# Patient Record
Sex: MALE | Race: WHITE | URBAN - METROPOLITAN AREA
[De-identification: names, ages, dates, MRNs, and addresses within clinical notes are randomized per-mention and may not be internally consistent; named-entity substitution may affect disease eponyms.]

---

## 2024-07-03 ENCOUNTER — HOSPITAL ENCOUNTER (INPATIENT)
Facility: HOSPITAL | Age: 19
LOS: 1 days | Discharge: LEFT BEFORE DISCHARGE | DRG: 092 | End: 2024-07-07
Attending: STUDENT IN AN ORGANIZED HEALTH CARE EDUCATION/TRAINING PROGRAM | Admitting: STUDENT IN AN ORGANIZED HEALTH CARE EDUCATION/TRAINING PROGRAM

## 2024-07-03 DIAGNOSIS — R65.10 SIRS (SYSTEMIC INFLAMMATORY RESPONSE SYNDROME): ICD-10-CM

## 2024-07-03 DIAGNOSIS — E16.2 HYPOGLYCEMIA: ICD-10-CM

## 2024-07-03 DIAGNOSIS — R07.9 CHEST PAIN: ICD-10-CM

## 2024-07-03 DIAGNOSIS — Z91.89 AT RISK FOR PROLONGED QT INTERVAL SYNDROME: ICD-10-CM

## 2024-07-03 DIAGNOSIS — F19.920 DRUG INTOXICATION WITHOUT COMPLICATION: ICD-10-CM

## 2024-07-03 DIAGNOSIS — R41.82 ALTERED MENTAL STATUS: Primary | ICD-10-CM

## 2024-07-03 LAB
ALBUMIN SERPL BCP-MCNC: 3.8 G/DL (ref 3.5–5.2)
ALLENS TEST: ABNORMAL
ALLENS TEST: NORMAL
ALP SERPL-CCNC: 65 U/L (ref 55–135)
ALT SERPL W/O P-5'-P-CCNC: 14 U/L (ref 10–44)
ANION GAP SERPL CALC-SCNC: 12 MMOL/L (ref 8–16)
APAP SERPL-MCNC: <3 UG/ML (ref 10–20)
APTT PPP: 22.3 SEC (ref 21–32)
AST SERPL-CCNC: 30 U/L (ref 10–40)
BASOPHILS # BLD AUTO: 0.03 K/UL (ref 0–0.2)
BASOPHILS NFR BLD: 0.3 % (ref 0–1.9)
BILIRUB SERPL-MCNC: 0.4 MG/DL (ref 0.1–1)
BUN SERPL-MCNC: 3 MG/DL (ref 6–20)
CALCIUM SERPL-MCNC: 8.8 MG/DL (ref 8.7–10.5)
CHLORIDE SERPL-SCNC: 110 MMOL/L (ref 95–110)
CK SERPL-CCNC: 1717 U/L (ref 20–200)
CO2 SERPL-SCNC: 24 MMOL/L (ref 23–29)
CREAT SERPL-MCNC: 0.9 MG/DL (ref 0.5–1.4)
DELSYS: ABNORMAL
DELSYS: NORMAL
DIFFERENTIAL METHOD BLD: ABNORMAL
EOSINOPHIL # BLD AUTO: 0 K/UL (ref 0–0.5)
EOSINOPHIL NFR BLD: 0 % (ref 0–8)
ERYTHROCYTE [DISTWIDTH] IN BLOOD BY AUTOMATED COUNT: 14 % (ref 11.5–14.5)
EST. GFR  (NO RACE VARIABLE): >60 ML/MIN/1.73 M^2
ETHANOL SERPL-MCNC: <10 MG/DL
GLUCOSE SERPL-MCNC: 69 MG/DL (ref 70–110)
HCO3 UR-SCNC: 26.4 MMOL/L (ref 24–28)
HCT VFR BLD AUTO: 45.3 % (ref 40–54)
HGB BLD-MCNC: 15.2 G/DL (ref 14–18)
IMM GRANULOCYTES # BLD AUTO: 0.01 K/UL (ref 0–0.04)
IMM GRANULOCYTES NFR BLD AUTO: 0.1 % (ref 0–0.5)
INR PPP: 1.2 (ref 0.8–1.2)
LDH SERPL L TO P-CCNC: 1.98 MMOL/L (ref 0.5–2.2)
LYMPHOCYTES # BLD AUTO: 1.9 K/UL (ref 1–4.8)
LYMPHOCYTES NFR BLD: 19.7 % (ref 18–48)
MAGNESIUM SERPL-MCNC: 1.9 MG/DL (ref 1.6–2.6)
MCH RBC QN AUTO: 31.7 PG (ref 27–31)
MCHC RBC AUTO-ENTMCNC: 33.6 G/DL (ref 32–36)
MCV RBC AUTO: 95 FL (ref 82–98)
MONOCYTES # BLD AUTO: 0.9 K/UL (ref 0.3–1)
MONOCYTES NFR BLD: 9.4 % (ref 4–15)
NEUTROPHILS # BLD AUTO: 6.7 K/UL (ref 1.8–7.7)
NEUTROPHILS NFR BLD: 70.5 % (ref 38–73)
NRBC BLD-RTO: 0 /100 WBC
PCO2 BLDA: 32.1 MMHG (ref 35–45)
PH SMN: 7.52 [PH] (ref 7.35–7.45)
PLATELET # BLD AUTO: 253 K/UL (ref 150–450)
PMV BLD AUTO: 10.3 FL (ref 9.2–12.9)
PO2 BLDA: 77 MMHG (ref 40–60)
POC BE: 4 MMOL/L
POC SATURATED O2: 97 % (ref 95–100)
POC TCO2: 27 MMOL/L (ref 24–29)
POTASSIUM SERPL-SCNC: 3.7 MMOL/L (ref 3.5–5.1)
PROCALCITONIN SERPL IA-MCNC: <0.02 NG/ML
PROT SERPL-MCNC: 6.8 G/DL (ref 6–8.4)
PROTHROMBIN TIME: 12.7 SEC (ref 9–12.5)
RBC # BLD AUTO: 4.79 M/UL (ref 4.6–6.2)
SAMPLE: ABNORMAL
SAMPLE: NORMAL
SITE: ABNORMAL
SITE: NORMAL
SODIUM SERPL-SCNC: 146 MMOL/L (ref 136–145)
TSH SERPL DL<=0.005 MIU/L-ACNC: 1.41 UIU/ML (ref 0.4–4)
WBC # BLD AUTO: 9.55 K/UL (ref 3.9–12.7)

## 2024-07-03 PROCEDURE — 85610 PROTHROMBIN TIME: CPT | Performed by: STUDENT IN AN ORGANIZED HEALTH CARE EDUCATION/TRAINING PROGRAM

## 2024-07-03 PROCEDURE — 96375 TX/PRO/DX INJ NEW DRUG ADDON: CPT

## 2024-07-03 PROCEDURE — 63600175 PHARM REV CODE 636 W HCPCS

## 2024-07-03 PROCEDURE — 80143 DRUG ASSAY ACETAMINOPHEN: CPT | Performed by: STUDENT IN AN ORGANIZED HEALTH CARE EDUCATION/TRAINING PROGRAM

## 2024-07-03 PROCEDURE — 82550 ASSAY OF CK (CPK): CPT | Performed by: STUDENT IN AN ORGANIZED HEALTH CARE EDUCATION/TRAINING PROGRAM

## 2024-07-03 PROCEDURE — 96361 HYDRATE IV INFUSION ADD-ON: CPT

## 2024-07-03 PROCEDURE — 85730 THROMBOPLASTIN TIME PARTIAL: CPT | Performed by: STUDENT IN AN ORGANIZED HEALTH CARE EDUCATION/TRAINING PROGRAM

## 2024-07-03 PROCEDURE — 84443 ASSAY THYROID STIM HORMONE: CPT | Performed by: STUDENT IN AN ORGANIZED HEALTH CARE EDUCATION/TRAINING PROGRAM

## 2024-07-03 PROCEDURE — 84145 PROCALCITONIN (PCT): CPT | Performed by: STUDENT IN AN ORGANIZED HEALTH CARE EDUCATION/TRAINING PROGRAM

## 2024-07-03 PROCEDURE — 87040 BLOOD CULTURE FOR BACTERIA: CPT | Performed by: STUDENT IN AN ORGANIZED HEALTH CARE EDUCATION/TRAINING PROGRAM

## 2024-07-03 PROCEDURE — 63600175 PHARM REV CODE 636 W HCPCS: Performed by: STUDENT IN AN ORGANIZED HEALTH CARE EDUCATION/TRAINING PROGRAM

## 2024-07-03 PROCEDURE — 80047 BASIC METABLC PNL IONIZED CA: CPT

## 2024-07-03 PROCEDURE — 82803 BLOOD GASES ANY COMBINATION: CPT

## 2024-07-03 PROCEDURE — 93005 ELECTROCARDIOGRAM TRACING: CPT

## 2024-07-03 PROCEDURE — 83605 ASSAY OF LACTIC ACID: CPT

## 2024-07-03 PROCEDURE — 85025 COMPLETE CBC W/AUTO DIFF WBC: CPT | Performed by: STUDENT IN AN ORGANIZED HEALTH CARE EDUCATION/TRAINING PROGRAM

## 2024-07-03 PROCEDURE — 80053 COMPREHEN METABOLIC PANEL: CPT | Performed by: STUDENT IN AN ORGANIZED HEALTH CARE EDUCATION/TRAINING PROGRAM

## 2024-07-03 PROCEDURE — 99900035 HC TECH TIME PER 15 MIN (STAT)

## 2024-07-03 PROCEDURE — 83735 ASSAY OF MAGNESIUM: CPT | Performed by: STUDENT IN AN ORGANIZED HEALTH CARE EDUCATION/TRAINING PROGRAM

## 2024-07-03 PROCEDURE — 93010 ELECTROCARDIOGRAM REPORT: CPT | Mod: ,,, | Performed by: INTERNAL MEDICINE

## 2024-07-03 PROCEDURE — 96376 TX/PRO/DX INJ SAME DRUG ADON: CPT

## 2024-07-03 PROCEDURE — 99285 EMERGENCY DEPT VISIT HI MDM: CPT | Mod: 25

## 2024-07-03 PROCEDURE — 82077 ASSAY SPEC XCP UR&BREATH IA: CPT | Performed by: STUDENT IN AN ORGANIZED HEALTH CARE EDUCATION/TRAINING PROGRAM

## 2024-07-03 PROCEDURE — 96374 THER/PROPH/DIAG INJ IV PUSH: CPT | Mod: 59

## 2024-07-03 RX ORDER — LORAZEPAM 2 MG/ML
2 INJECTION INTRAMUSCULAR
Status: COMPLETED | OUTPATIENT
Start: 2024-07-03 | End: 2024-07-03

## 2024-07-03 RX ORDER — LORAZEPAM 2 MG/ML
1 INJECTION INTRAMUSCULAR
Status: COMPLETED | OUTPATIENT
Start: 2024-07-03 | End: 2024-07-03

## 2024-07-03 RX ORDER — ACETAMINOPHEN 500 MG
1000 TABLET ORAL
Status: DISPENSED | OUTPATIENT
Start: 2024-07-03 | End: 2024-07-04

## 2024-07-03 RX ORDER — HALOPERIDOL 5 MG/ML
5 INJECTION INTRAMUSCULAR
Status: COMPLETED | OUTPATIENT
Start: 2024-07-03 | End: 2024-07-03

## 2024-07-03 RX ADMIN — LORAZEPAM 1 MG: 2 INJECTION INTRAMUSCULAR; INTRAVENOUS at 10:07

## 2024-07-03 RX ADMIN — LORAZEPAM 2 MG: 2 INJECTION INTRAMUSCULAR; INTRAVENOUS at 08:07

## 2024-07-03 RX ADMIN — SODIUM CHLORIDE, POTASSIUM CHLORIDE, SODIUM LACTATE AND CALCIUM CHLORIDE 1914 ML: 600; 310; 30; 20 INJECTION, SOLUTION INTRAVENOUS at 08:07

## 2024-07-03 RX ADMIN — HALOPERIDOL LACTATE 5 MG: 5 INJECTION, SOLUTION INTRAMUSCULAR at 07:07

## 2024-07-03 RX ADMIN — IOHEXOL 75 ML: 350 INJECTION, SOLUTION INTRAVENOUS at 11:07

## 2024-07-03 NOTE — Clinical Note
Diagnosis: Altered mental status [780.97.ICD-9-CM]   Future Attending Provider: GARCÍA TINAJERO [[id number]]   Is the patient being sent to ED Observation?: No

## 2024-07-04 PROBLEM — E04.1 THYROID NODULE: Status: ACTIVE | Noted: 2024-07-04

## 2024-07-04 PROBLEM — Z59.00 HOMELESSNESS: Chronic | Status: ACTIVE | Noted: 2024-07-04

## 2024-07-04 PROBLEM — Z91.89 AT RISK FOR PROLONGED QT INTERVAL SYNDROME: Status: ACTIVE | Noted: 2024-07-04

## 2024-07-04 PROBLEM — R50.9 FEVER: Status: ACTIVE | Noted: 2024-07-04

## 2024-07-04 PROBLEM — G92.8 TOXIC METABOLIC ENCEPHALOPATHY: Status: ACTIVE | Noted: 2024-07-04

## 2024-07-04 PROBLEM — R74.8 ELEVATED CPK: Status: ACTIVE | Noted: 2024-07-04

## 2024-07-04 PROBLEM — R65.10 SIRS (SYSTEMIC INFLAMMATORY RESPONSE SYNDROME): Status: ACTIVE | Noted: 2024-07-04

## 2024-07-04 PROBLEM — E87.3 RESPIRATORY ALKALOSIS: Status: ACTIVE | Noted: 2024-07-04

## 2024-07-04 PROBLEM — R41.82 ALTERED MENTAL STATUS: Status: ACTIVE | Noted: 2024-07-04

## 2024-07-04 LAB
AMPHET+METHAMPHET UR QL: NEGATIVE
BARBITURATES UR QL SCN>200 NG/ML: NEGATIVE
BENZODIAZ UR QL SCN>200 NG/ML: NEGATIVE
BILIRUB UR QL STRIP: NEGATIVE
BZE UR QL SCN: NEGATIVE
CANNABINOIDS UR QL SCN: ABNORMAL
CLARITY UR REFRACT.AUTO: CLEAR
COLOR UR AUTO: COLORLESS
CREAT UR-MCNC: 31 MG/DL (ref 23–375)
GLUCOSE UR QL STRIP: NEGATIVE
HGB UR QL STRIP: NEGATIVE
INFLUENZA A, MOLECULAR: NOT DETECTED
INFLUENZA B, MOLECULAR: NOT DETECTED
KETONES UR QL STRIP: NEGATIVE
LEUKOCYTE ESTERASE UR QL STRIP: NEGATIVE
METHADONE UR QL SCN>300 NG/ML: NEGATIVE
NITRITE UR QL STRIP: NEGATIVE
OHS QRS DURATION: 102 MS
OHS QRS DURATION: 102 MS
OHS QTC CALCULATION: 463 MS
OHS QTC CALCULATION: 463 MS
OPIATES UR QL SCN: NEGATIVE
PCP UR QL SCN>25 NG/ML: NEGATIVE
PH UR STRIP: 7 [PH] (ref 5–8)
PROT UR QL STRIP: NEGATIVE
RSV AG BY MOLECULAR METHOD: NOT DETECTED
SARS-COV-2 RNA RESP QL NAA+PROBE: NOT DETECTED
SP GR UR STRIP: 1.02 (ref 1–1.03)
TOXICOLOGY INFORMATION: ABNORMAL
URN SPEC COLLECT METH UR: ABNORMAL

## 2024-07-04 PROCEDURE — 25500020 PHARM REV CODE 255: Performed by: STUDENT IN AN ORGANIZED HEALTH CARE EDUCATION/TRAINING PROGRAM

## 2024-07-04 PROCEDURE — 96365 THER/PROPH/DIAG IV INF INIT: CPT

## 2024-07-04 PROCEDURE — 80307 DRUG TEST PRSMV CHEM ANLYZR: CPT | Performed by: STUDENT IN AN ORGANIZED HEALTH CARE EDUCATION/TRAINING PROGRAM

## 2024-07-04 PROCEDURE — G0378 HOSPITAL OBSERVATION PER HR: HCPCS

## 2024-07-04 PROCEDURE — 63600175 PHARM REV CODE 636 W HCPCS

## 2024-07-04 PROCEDURE — 96361 HYDRATE IV INFUSION ADD-ON: CPT

## 2024-07-04 PROCEDURE — 25000003 PHARM REV CODE 250

## 2024-07-04 PROCEDURE — 0241U SARS-COV2 (COVID) WITH FLU/RSV BY PCR: CPT | Performed by: STUDENT IN AN ORGANIZED HEALTH CARE EDUCATION/TRAINING PROGRAM

## 2024-07-04 PROCEDURE — 81003 URINALYSIS AUTO W/O SCOPE: CPT | Mod: 59 | Performed by: STUDENT IN AN ORGANIZED HEALTH CARE EDUCATION/TRAINING PROGRAM

## 2024-07-04 RX ORDER — SODIUM CHLORIDE 0.9 % (FLUSH) 0.9 %
1-10 SYRINGE (ML) INJECTION EVERY 12 HOURS PRN
Status: DISCONTINUED | OUTPATIENT
Start: 2024-07-04 | End: 2024-07-07 | Stop reason: HOSPADM

## 2024-07-04 RX ORDER — IBUPROFEN 200 MG
16 TABLET ORAL
Status: DISCONTINUED | OUTPATIENT
Start: 2024-07-04 | End: 2024-07-07 | Stop reason: HOSPADM

## 2024-07-04 RX ORDER — SIMETHICONE 80 MG
1 TABLET,CHEWABLE ORAL 4 TIMES DAILY PRN
Status: DISCONTINUED | OUTPATIENT
Start: 2024-07-04 | End: 2024-07-07 | Stop reason: HOSPADM

## 2024-07-04 RX ORDER — ONDANSETRON 8 MG/1
8 TABLET, ORALLY DISINTEGRATING ORAL EVERY 8 HOURS PRN
Status: DISCONTINUED | OUTPATIENT
Start: 2024-07-04 | End: 2024-07-05

## 2024-07-04 RX ORDER — ALUMINUM HYDROXIDE, MAGNESIUM HYDROXIDE, AND SIMETHICONE 1200; 120; 1200 MG/30ML; MG/30ML; MG/30ML
30 SUSPENSION ORAL 4 TIMES DAILY PRN
Status: DISCONTINUED | OUTPATIENT
Start: 2024-07-04 | End: 2024-07-07 | Stop reason: HOSPADM

## 2024-07-04 RX ORDER — TALC
6 POWDER (GRAM) TOPICAL NIGHTLY PRN
Status: DISCONTINUED | OUTPATIENT
Start: 2024-07-04 | End: 2024-07-07 | Stop reason: HOSPADM

## 2024-07-04 RX ORDER — ACETAMINOPHEN 325 MG/1
650 TABLET ORAL EVERY 4 HOURS PRN
Status: DISCONTINUED | OUTPATIENT
Start: 2024-07-04 | End: 2024-07-07 | Stop reason: HOSPADM

## 2024-07-04 RX ORDER — IBUPROFEN 200 MG
24 TABLET ORAL
Status: DISCONTINUED | OUTPATIENT
Start: 2024-07-04 | End: 2024-07-07 | Stop reason: HOSPADM

## 2024-07-04 RX ORDER — POLYETHYLENE GLYCOL 3350 17 G/17G
17 POWDER, FOR SOLUTION ORAL DAILY PRN
Status: DISCONTINUED | OUTPATIENT
Start: 2024-07-04 | End: 2024-07-07 | Stop reason: HOSPADM

## 2024-07-04 RX ORDER — ACETAMINOPHEN 325 MG/1
650 TABLET ORAL EVERY 8 HOURS PRN
Status: DISCONTINUED | OUTPATIENT
Start: 2024-07-04 | End: 2024-07-07 | Stop reason: HOSPADM

## 2024-07-04 RX ORDER — NALOXONE HCL 0.4 MG/ML
0.02 VIAL (ML) INJECTION
Status: DISCONTINUED | OUTPATIENT
Start: 2024-07-04 | End: 2024-07-07 | Stop reason: HOSPADM

## 2024-07-04 RX ORDER — GLUCAGON 1 MG
1 KIT INJECTION
Status: DISCONTINUED | OUTPATIENT
Start: 2024-07-04 | End: 2024-07-07 | Stop reason: HOSPADM

## 2024-07-04 RX ADMIN — SODIUM CHLORIDE, POTASSIUM CHLORIDE, SODIUM LACTATE AND CALCIUM CHLORIDE 1000 ML: 600; 310; 30; 20 INJECTION, SOLUTION INTRAVENOUS at 12:07

## 2024-07-04 RX ADMIN — DEXTROSE MONOHYDRATE 250 ML: 100 INJECTION, SOLUTION INTRAVENOUS at 01:07

## 2024-07-04 NOTE — ED NOTES
I-STAT Chem-8+ Results:   Value Reference Range   Sodium 144 136-145 mmol/L   Potassium  4.0 3.5-5.1 mmol/L   Chloride 109  mmol/L   Ionized Calcium 0.98 1.06-1.42 mmol/L   CO2 (measured) 26 23-29 mmol/L   Glucose 71  mg/dL   BUN <3 6-30 mg/dL   Creatinine 0.9 0.5-1.4 mg/dL   Hematocrit 40 36-54%

## 2024-07-04 NOTE — HPI
Beka Davis is a 18 y.o. male with unknown medical history who presents after being found under a truck.    Due to receiving sedation in the ED for agitation, he was unable to provide a history.    Per the ED, he was brought in by EMS after someone found him under a semi truck that he had been staying under.  He was reportedly oriented to person on arrival here, however agitated.  Received Ativan and Haldol, and now we will not participate in questioning or exam.  He was initially febrile and tachycardic.  Workup overall unremarkable for obvious etiology, therefore Hospital Medicine was consulted for admission.  He was PEC'd by the ED.

## 2024-07-04 NOTE — ED NOTES
Pt remains in paper scrubs, resting in stretcher comfortably - with side rails up, locked, and in lowest position. Chest rise and fall noted; breathing equal, even, and unlabored. Sitter remains at bedside in direct visual contact, charting per protocol every 15 minutes. No equipment or belongings are in the patients room to prevent self harm or injury. No acute distress noted and no needs expressed at this time. Will continue to assess periodically.

## 2024-07-04 NOTE — ED PROVIDER NOTES
Encounter Date: 7/3/2024       History     Chief Complaint   Patient presents with    Drug Overdose     Pt was found under semi truck by . Witnessed that pt under truck x 2 days. Pt is AAO x 2 and febrile upon arrival. Admittedly takes DMX.      Mr. Goodwin is a 18 y.o. homeless male who presents to the ED with AMS and agitation for an unknown timeline. Patient [place] bridge by EMS. Patient is AO x1 with orientation to self. Patient states that he is hungry. Patient denies any knowledge of taking any substances and doesn't remember anything. Patient is agitated and in 4 point restraints.     The history is provided by the patient. The history is limited by the condition of the patient.     Review of patient's allergies indicates:  No Known Allergies  History reviewed. No pertinent past medical history.  History reviewed. No pertinent surgical history.  No family history on file.     Review of Systems   Constitutional:  Negative for fever.   HENT:  Negative for sore throat.    Respiratory:  Negative for shortness of breath.    Cardiovascular:  Negative for chest pain.   Gastrointestinal:  Negative for nausea.   Genitourinary:  Negative for dysuria.   Musculoskeletal:  Negative for back pain.   Skin:  Negative for rash.   Neurological:  Negative for weakness.   Hematological:  Does not bruise/bleed easily.       Physical Exam     Initial Vitals [07/03/24 1843]   BP Pulse Resp Temp SpO2   (!) 141/72 (!) 130 (!) 30 (!) 101.7 °F (38.7 °C) 98 %      MAP       --         Physical Exam    Constitutional: He appears distressed.   HENT:   Head: Normocephalic and atraumatic.   Right Ear: External ear normal.   Left Ear: External ear normal.   Eyes: EOM are normal. Pupils are equal, round, and reactive to light.   Neck:       Red scratches along the neck evident of some strangulation or scratching   Cardiovascular:  Normal rate, regular rhythm, normal heart sounds and intact distal pulses.            Pulmonary/Chest: Breath sounds normal.           ED Course   Procedures  Labs Reviewed   COMPREHENSIVE METABOLIC PANEL - Abnormal; Notable for the following components:       Result Value    Sodium 146 (*)     Glucose 69 (*)     BUN 3 (*)     All other components within normal limits   URINALYSIS, REFLEX TO URINE CULTURE - Abnormal; Notable for the following components:    Color, UA Colorless (*)     All other components within normal limits    Narrative:     Specimen Source->Urine   CK - Abnormal; Notable for the following components:    CPK 1717 (*)     All other components within normal limits   PROTIME-INR - Abnormal; Notable for the following components:    Prothrombin Time 12.7 (*)     All other components within normal limits   DRUG SCREEN PANEL, URINE EMERGENCY - Abnormal; Notable for the following components:    THC Presumptive Positive (*)     All other components within normal limits    Narrative:     Specimen Source->Urine   ACETAMINOPHEN LEVEL - Abnormal; Notable for the following components:    Acetaminophen (Tylenol), Serum <3.0 (*)     All other components within normal limits   CBC W/ AUTO DIFFERENTIAL - Abnormal; Notable for the following components:    MCH 31.7 (*)     All other components within normal limits   ISTAT PROCEDURE - Abnormal; Notable for the following components:    POC PH 7.524 (*)     POC PCO2 32.1 (*)     POC PO2 77 (*)     POC BE 4 (*)     All other components within normal limits   CULTURE, BLOOD    Narrative:     Aerobic and anaerobic   CULTURE, BLOOD    Narrative:     Aerobic and anaerobic   PROCALCITONIN   APTT   MAGNESIUM   TSH   ALCOHOL,MEDICAL (ETHANOL)   SARS-COV2 (COVID) WITH FLU/RSV BY PCR   ISTAT LACTATE   ISTAT CHEM8   POCT GLUCOSE MONITORING CONTINUOUS        ECG Results              EKG 12-lead (Final result)        Collection Time Result Time QRS Duration OHS QTC Calculation    07/03/24 21:56:48 07/04/24 09:28:12 102 463                     Final result by  Interface, Lab [facility] (07/04/24 09:28:20)                   Narrative:    Test Reason : Z91.89,    Vent. Rate : 077 BPM     Atrial Rate : 077 BPM     P-R Int : 144 ms          QRS Dur : 102 ms      QT Int : 410 ms       P-R-T Axes : 071 085 027 degrees     QTc Int : 463 ms    Normal sinus rhythm  Nonspecific ST abnormality  Abnormal ECG  No previous ECGs available  Confirmed by Fernando REY MD ([id number]) on 7/4/2024 9:28:10 AM    Referred By: [email or web address]   SELF           Confirmed By:Fernando REY MD                                  Imaging Results              CTA Head and Neck (xpd) (Final result)  Result time 07/03/24 23:49:58      Final result by Chon Kearns MD (07/03/24 23:49:58)                   Impression:      Examination is significantly degraded secondary to motion.    No CT evidence of large territorial infarction.  MRI of the brain may be obtained further evaluation    No acute vascular abnormality in the neck and brain, allowing for motion limitations.  Suggest repeat examination, when the patient is able tolerate positioning.    Thyroid nodules.  Outpatient thyroid ultrasound is suggested for further evaluation.      Electronically signed by: Chon Kearns MD  Date:    07/03/2024  Time:    23:49               Narrative:    EXAMINATION:  CTA HEAD AND NECK (XPD)    CLINICAL HISTORY:  Neuro deficit, acute, stroke suspected;AMS and potential strangulation injury;    TECHNIQUE:  Non contrast low dose axial images were obtained through the head.  CT angiogram was performed from the level of the phoenix to the top of the head following the IV administration of 75mL of Omnipaque 350.   Sagittal and coronal reconstructions and maximum intensity projection reconstructions were performed. Arterial stenosis percentages are based on NASCET measurement criteria.    CT source data was analyzed using artificial intelligence software for detection of a large vessel occlusions (LVO) in order to enable computer assisted triage  notification and aid clinical stroke decision making.    COMPARISON:  None    FINDINGS:  Noncontrast CT head:    The subcutaneous tissues are unremarkable.  The bony calvarium is intact.  The paranasal sinuses are unremarkable.  The mastoid air cells are clear.  The orbits and intraorbital contents are unremarkable.    The craniocervical junction is intact.  The sellar and parasellar structures are unremarkable.  There is no evidence of intracranial hemorrhage.  The ventricles and sulci are within normal limits.  The cisterns are unremarkable.  The gray-white differentiation is maintained.  There is no dense vessel sign.  There is no evidence of mass effect.    CTA neck:    The visualized pulmonary tree is within normal limits.  The great vessels arising from the aortic arch are within normal limits.  The visualized subclavian arteries are within normal limits.    There are some motion limitations at the base of the neck.  Overall evaluation of the carotids at the base of the neck is significantly limited.    The vertebral artery origins are unremarkable.  The vertebral arteries appear normal in caliber.    No definite evidence of dissection or aneurysm of the neck vessels.    CTA head:    The intracranial segments of the ICA appear grossly unremarkable.  The anterior cerebral arteries appear grossly intact.  The middle cerebral arteries are unremarkable.    The basilar is unremarkable.  The posterior cerebral arteries are unremarkable.    The dural venous sinuses are within normal limits.    There is no abnormal intracranial enhancement.    Additional findings:    There are subcentimeter bilateral thyroid nodules.  The remainder of the soft tissue the neck are within normal limits.    The visualized lung apices are unremarkable.  There is no evidence of a pneumothorax.                                       X-Ray Chest AP Portable (Final result)  Result time 07/03/24 20:17:35      Final result by Nigel Woods,  MD (07/03/24 20:17:35)                   Impression:      1. Coarse interstitial attenuation noting patient is rotated.  Edema or other nonspecific pneumonitis are considerations.  Correlation is advised.      Electronically signed by: Nigel Woods MD  Date:    07/03/2024  Time:    20:17               Narrative:    EXAMINATION:  XR CHEST AP PORTABLE    CLINICAL HISTORY:  Sepsis;    TECHNIQUE:  Single frontal view of the chest was performed.    COMPARISON:  None    FINDINGS:  Patient is rotated.    The cardiomediastinal silhouette is not enlarged.  There is no pleural effusion.  The trachea is midline.  The lungs are symmetrically expanded bilaterally with coarse interstitial attenuation.  No large focal consolidation seen.  There is no pneumothorax.  The osseous structures are unremarkable.                                       Medications   acetaminophen tablet 1,000 mg (1,000 mg Oral Not Given 7/3/24 1930)   sodium chloride 0.9% flush 1-10 mL (has no administration in time range)   melatonin tablet 6 mg (has no administration in time range)   ondansetron disintegrating tablet 8 mg (has no administration in time range)   polyethylene glycol packet 17 g (has no administration in time range)   acetaminophen tablet 650 mg (has no administration in time range)   simethicone chewable tablet 80 mg (has no administration in time range)   aluminum-magnesium hydroxide-simethicone 200-200-20 mg/5 mL suspension 30 mL (has no administration in time range)   acetaminophen tablet 650 mg (has no administration in time range)   naloxone 0.4 mg/mL injection 0.02 mg (has no administration in time range)   glucose chewable tablet 16 g (has no administration in time range)   glucose chewable tablet 24 g (has no administration in time range)   glucagon (human recombinant) injection 1 mg (has no administration in time range)   lactated ringers bolus 1,914 mL (0 mLs Intravenous Stopped 7/4/24 0023)   haloperidol lactate injection 5 mg  "(5 mg Intravenous Given 7/3/24 1936)   LORazepam injection 2 mg (2 mg Intravenous Given 7/3/24 2026)   LORazepam injection 1 mg (1 mg Intravenous Given 7/3/24 2212)   iohexoL (OMNIPAQUE 350) injection 75 mL (75 mLs Intravenous Given 7/3/24 2304)   lactated ringers bolus 1,000 mL (0 mLs Intravenous Stopped 7/4/24 0116)   dextrose 10% bolus 250 mL 250 mL (0 mLs Intravenous Stopped 7/4/24 0213)     Medical Decision Making      This patient does not have evidence of infective focus  My overall impression is not septic shock.  Source: Skin and Soft Tissue   Antibiotics given- Antibiotics (72h ago, onward)    Start     Stop Route Frequency Ordered    07/03/24 2030  vancomycin 1,500 mg in D5W 250 mL IVPB (Vial-Mate)    (Sepsis Workup)         -- IV Once 07/03/24 1916 07/03/24 1930  piperacillin-tazobactam (ZOSYN) 4.5 g in D5W 100 mL IVPB   (MB+)  (Sepsis Workup)         07/04/24 0729 IV ED 1 Time 07/03/24 1916      Latest lactate reviewed-  Lab             07/03/24 2127          POCLAC       1.98            Fluid challenge Ideal Body Weight- The patient's ideal body weight is Ideal body weight: 63.8 kg (140 lb 10.5 oz) which will be used to calculate fluid bolus of 30 ml/kg for treatment of septic shock.      Post- resuscitation assessment Yes Perfusion exam was performed within 6 hours of septic shock presentation after bolus shows Adequate tissue perfusion assessed by non-invasive monitoring       Will Not start Pressors- Levophed for MAP of 65    Restraint Note        BP (!) 141/72 (BP Location: Right arm, Patient Position: Lying)   Pulse (!) 130   Temp (!) 101.7 °F (38.7 °C) (Oral)   Resp (!) 30   Ht 5' 6" (1.676 m)   Wt 63.5 kg (140 lb)   SpO2 98%   BMI 22.60 kg/m²     Time: 7:34 PM    Patient's immediate situation: Patient is currently agitated, Aox1 and a danger to himself     Reaction to intervention: patient was okay with restraints    Medications/behavioral condition: 5mg Haldol, " "3mg ativan    Restraint status: 4-point    Restraint Note        BP (!) 140/88 (BP Location: Right arm, Patient Position: Lying)   Pulse 96   Temp 99.2 °F (37.3 °C)   Resp 16   Ht 5' 6" (1.676 m)   Wt 63.5 kg (140 lb)   SpO2 96%   BMI 22.60 kg/m²     Time: 11:27 PM    Patient's immediate situation: relaxing in bed, asleep    Reaction to intervention: patient is asleep    Medications/behavioral condition: haldol 5mg, ativan 4mg    Restraint status: no restraints at this time     Due to patient's altered mental status and agitation, admitting to observation.      Amount and/or Complexity of Data Reviewed  Labs: ordered.     Details: Sepsis workup ordered and reviewed.   Radiology: ordered.     Details: CT head reviewed.  ECG/medicine tests: ordered.     Details: EKG reviewed. No QT prolongation.    Risk  OTC drugs.  Prescription drug management.  Decision regarding hospitalization.  Diagnosis or treatment significantly limited by social determinants of health.              Attending Attestation:   Physician Attestation Statement for Resident:  As the supervising MD   Physician Attestation Statement: I have personally seen and examined this patient.   I agree with the above history.  -:   As the supervising MD I agree with the above PE.     As the supervising MD I agree with the above treatment, course, plan, and disposition.   -: Differential diagnoses considered includes substance abuse, manic episode, heat exhaustion, heat stroke, strangulation injury, sepsis, UTI, pneumonia, rhabdomyolysis    Pt PEC'd for grave disability.     CBC unremarkable without leukocytosis or anemia. CMP shows mild hypoglycemia, will give D10 since patient likely won't tolerate PO currently. UDS positive for marijuana. UA shows no infection. Tox vs Psych still most likely etiology of patient's symptoms.     Resident discussed the case with HM who admitted him to their service.     Procedure: Critical Care  Please put in 105 minutes " of critical care due to patient having a high risk of neurological failure.                                     Medical Decision Making:   Clinical Tests:   Sepsis Perfusion Assessment: "I attest a sepsis perfusion exam was performed within 6 hours of sepsis, severe sepsis, or septic shock presentation, following fluid resuscitation."             Clinical Impression:  Final diagnoses:  [Z91.89] At risk for prolonged QT interval syndrome  [R41.82] Altered mental status (Primary)  [F19.920] Drug intoxication without complication  [R65.10] SIRS (systemic inflammatory response syndrome)  [E16.2] Hypoglycemia          ED Disposition Condition    Observation Stable                Henry Reyes MD  Resident  07/04/24 0032       Balta Delgado MD  07/04/24 2017

## 2024-07-04 NOTE — ASSESSMENT & PLAN NOTE
Respiratory alkalosis noted on VBG, likely secondary to illicit substance use/ overdose.  Monitor.

## 2024-07-04 NOTE — ASSESSMENT & PLAN NOTE
Reportedly homeless, complicating his health.  May consider social work consultation for resources if needed.

## 2024-07-04 NOTE — NURSING
Patient is PEC'd, no nonviolent restraints, siderails up x3. Pt is not a threat to himself or anyone else.

## 2024-07-04 NOTE — ASSESSMENT & PLAN NOTE
Presents with reported confusion then agitation after being found under a truck. Initially oriented to person, but is not answering questions on my exam after receiving sedation in the ED. Workup overall unrevealing for obvious etiology, including CT head.  Urine drug screen pending; highly suspect illicit substance use leading to agitation and encephalopathy.  We will follow.  Monitor for signs of illicit substance withdrawal, and treat with supportive care.  We will need to gather more information after his sedation wears off to evaluate for possible medical history including seizure disorder, psychiatric illness, etc.    Of note, he was PEC'd by the ED due to agitation.

## 2024-07-04 NOTE — ASSESSMENT & PLAN NOTE
CPK mildly elevated at 1717, without MAHESH or elevated LFTs, therefore does not represent rhabdomyolysis.  Continue IV fluids.

## 2024-07-04 NOTE — ED NOTES
Pt in bilateral upper and lower extremity restraints. ED sitter at bedside. All non medically necessary equipment removed from room.

## 2024-07-04 NOTE — H&P
[facility] - Emergency Dept  [facility] Medicine  History & Physical    Patient Name: Beka Davis  MRN: [id number]  Patient Class: OP- Observation  Admission Date: 7/3/2024  Attending Physician: Renita Freitas MD   Primary Care Provider: [facility] Primary Doctor         Patient information was obtained from ER records.     Subjective:     Principal Problem:Toxic metabolic encephalopathy    Chief Complaint:   Chief Complaint   Patient presents with    Drug Overdose     Pt was found under semi truck by . Witnessed that pt under truck x 2 days. Pt is AAO x 2 and febrile upon arrival. Admittedly takes DMX.         HPI: Beka Davis is a 18 y.o. male with unknown medical history who presents after being found under a truck.    Due to receiving sedation in the ED for agitation, he was unable to provide a history.    Per the ED, he was brought in by EMS after someone found him under a semi truck that he had been staying under.  He was reportedly oriented to person on arrival here, however agitated.  Received Ativan and Haldol, and now we will not participate in questioning or exam.  He was initially febrile and tachycardic.  Workup overall unremarkable for obvious etiology, therefore Hospital Medicine was consulted for admission.  He was PEC'd by the ED.    No past medical history on file.    No past surgical history on file.    Review of patient's allergies indicates:  No Known Allergies    No current facility-administered medications on file prior to encounter.     No current outpatient medications on file prior to encounter.     Family History    None       Tobacco Use    Smoking status: Not on file    Smokeless tobacco: Not on file   Substance and Sexual Activity    Alcohol use: Not on file    Drug use: Not on file    Sexual activity: Not on file     Review of Systems   Unable to perform ROS: Mental status change     Objective:     Vital Signs (Most Recent):  Temp: 99.2 °F (37.3 °C) (07/03/24 2322)  Pulse: 96  (07/03/24 2322)  Resp: 16 (07/03/24 2322)  BP: (!) 140/88 (07/03/24 2322)  SpO2: 96 % (07/03/24 2322) Vital Signs (24h Range):  Temp:  [99.2 °F (37.3 °C)-101.7 °F (38.7 °C)] 99.2 °F (37.3 °C)  Pulse:  [] 96  Resp:  [16-30] 16  SpO2:  [96 %-98 %] 96 %  BP: (140-141)/(72-88) 140/88     Weight: 63.5 kg (140 lb)  Body mass index is 22.6 kg/m².     Physical Exam  Vitals and nursing note reviewed.   Constitutional:       General: He is not in acute distress.     Appearance: He is well-developed. He is not diaphoretic.      Comments: Disheveled appearance   HENT:      Head: Normocephalic and atraumatic.      Mouth/Throat:      Mouth: Mucous membranes are moist.   Neck:      Vascular: No JVD.   Cardiovascular:      Rate and Rhythm: Normal rate and regular rhythm.   Pulmonary:      Effort: Pulmonary effort is normal. No respiratory distress.   Abdominal:      General: There is no distension.      Tenderness: There is no abdominal tenderness.   Skin:     Coloration: Skin is not jaundiced or pale.      Findings: No rash.      Comments: Dry, cracked skin on bilateral feet   Neurological:      Motor: No abnormal muscle tone.      Comments: Moves all extremities equally, does not attempt to open eyes or engage in conversation, no obvious tremor or seizure activity   Psychiatric:         Speech: He is noncommunicative.         Behavior: Behavior is slowed.                Significant Labs: All pertinent labs within the past 24 hours have been reviewed.  CBC:   Recent Labs   Lab 07/03/24  2208   WBC 9.55   HGB 15.2   HCT 45.3        CMP:   Recent Labs   Lab 07/03/24  2058   *   K 3.7      CO2 24   GLU 69*   BUN 3*   CREATININE 0.9   CALCIUM 8.8   PROT 6.8   ALBUMIN 3.8   BILITOT 0.4   ALKPHOS 65   AST 30   ALT 14   ANIONGAP 12       Significant Imaging: I have reviewed all pertinent imaging results/findings within the past 24 hours.  Assessment/Plan:     * Toxic metabolic encephalopathy  Presents with  reported confusion then agitation after being found under a truck. Initially oriented to person, but is not answering questions on my exam after receiving sedation in the ED. Workup overall unrevealing for obvious etiology, including CT head.  Urine drug screen pending; highly suspect illicit substance use leading to agitation and encephalopathy.  We will follow.  Monitor for signs of illicit substance withdrawal, and treat with supportive care.  We will need to gather more information after his sedation wears off to evaluate for possible medical history including seizure disorder, psychiatric illness, etc.    Of note, he was PEC'd by the ED due to agitation.      Elevated CPK  CPK mildly elevated at 1717, without MAHESH or elevated LFTs, therefore does not represent rhabdomyolysis.  Continue IV fluids.      Thyroid nodule  Thyroid nodules noted on CT.  TSH within normal limits.  Recommend outpatient monitoring.      SIRS (systemic inflammatory response syndrome)  Initially febrile on presentation with tachycardia, however without signs of focal infection.  He has no leukocytosis, and lactate within normal limits.  Highly suspect secondary to toxidrome vs. viral infection.  Check COVID and flu.  We will hold off on antibiotics for now and monitor, with low threshold to initiate if he develops hemodynamic instability.      Respiratory alkalosis  Respiratory alkalosis noted on VBG, likely secondary to illicit substance use/ overdose.  Monitor.      Homelessness  Reportedly homeless, complicating his health.  May consider social work consultation for resources if needed.        VTE Risk Mitigation (From admission, onward)      None               On 07/04/2024, patient should be placed in hospital observation services under my care.             Avni Ravi MD  Department of Hospital Medicine  [facility] - Emergency Dept

## 2024-07-04 NOTE — ED TRIAGE NOTES
Beka Davis, an 18 y.o. male presents to the ED via EMS for a dmx overdose. Pt confused and oriented to person only. Unable to obtain hx.        LOC: The patient is awake and oriented to person only. Confused.  APPEARANCE: Pt is dirty. Clothing is wet.  SKIN: The skin is warm and wet from wet clothing.  MUSCULOSKELETAL: Patient moving all extremities spontaneously, no swelling noted.  RESPIRATORY: Airway is open and patent, respirations are spontaneous, patient has a normal effort and rate, no accessory muscle use noted.  CARDIAC: Patient has a normal rate and regular rhythm, no edema noted, capillary refill < 3 seconds.   GASTRO: Soft and non tender to palpation, no distention noted.   : Pt denies any pain or frequency with urination.  NEURO: Pt opens eyes spontaneously.          Chief Complaint   Patient presents with    Drug Overdose     Pt was found under semi truck by . Witnessed that pt under truck x 2 days. Pt is AAO x 2 and febrile upon arrival. Admittedly takes DMX.      Review of patient's allergies indicates:  No Known Allergies  No past medical history on file.

## 2024-07-04 NOTE — ED NOTES
Telemetry Verification   Patient placed on Telemetry Box  Verified on ED monitor  Box 0004   Monitor Tech  Hermes   Rate 88   Rhythm NSR

## 2024-07-04 NOTE — PLAN OF CARE
Patient seen and examined today. Agree with plan as previously described in H&P. Upon my evaluation, patient denies all symptoms. Unable to tell me what he remembers prior to coming to the hospital. He is oriented to situation and time. Denies drug use. Patient is unconcerned with events that lead to his hospitalization. When asked if he has a place to go when he leaves the hospital, he reported that he stays with his mother. Reports that his mother is aware that he is in the hospital but he does not want his mother to be updated in regards to his care. Denies any medical problems. Patient denies having ever seen a doctor in his life, including a pediatrician as a child. Patient denies SI or HI. Denies auditory or visual hallucinations. On physical exam, patient noted to have scars on his chest and abdomen. I am concerned about this patient's medical decision making capacity. Psychiatry has been consulted for assistance.       Tomasa Crowder MD  Department of Hospital Medicine

## 2024-07-04 NOTE — NURSING
Nurses Note -- 4 Eyes      7/4/2024   8:11 AM      Skin assessed during: Admit      [x] No Altered Skin Integrity Present    []Prevention Measures Documented      [] Yes- Altered Skin Integrity Present or Discovered   [] LDA Added if Not in Epic (Describe Wound)   [] New Altered Skin Integrity was Present on Admit and Documented in LDA   [] Wound Image Taken    Wound Care Consulted? No    Attending Nurse:  VALENTINO Heath    Second RN/Staff Member:   VALENTINO Hodgson

## 2024-07-04 NOTE — ED NOTES
Assumed pt care at this time. Pt is in paper scrubs, resting in stretcher comfortably - with side rails up, locked, and in lowest position. Chest rise and fall noted; breathing equal, even, and unlabored. Sitter remains at bedside in direct visual contact, charting per protocol every 15 minutes. No equipment or belongings are in the patients room to prevent self harm or injury. No acute distress noted and no needs expressed at this time. Will continue to assess periodically.

## 2024-07-04 NOTE — SUBJECTIVE & OBJECTIVE
No past medical history on file.    No past surgical history on file.    Review of patient's allergies indicates:  No Known Allergies    No current facility-administered medications on file prior to encounter.     No current outpatient medications on file prior to encounter.     Family History    None       Tobacco Use    Smoking status: Not on file    Smokeless tobacco: Not on file   Substance and Sexual Activity    Alcohol use: Not on file    Drug use: Not on file    Sexual activity: Not on file     Review of Systems   Unable to perform ROS: Mental status change     Objective:     Vital Signs (Most Recent):  Temp: 99.2 °F (37.3 °C) (07/03/24 2322)  Pulse: 96 (07/03/24 2322)  Resp: 16 (07/03/24 2322)  BP: (!) 140/88 (07/03/24 2322)  SpO2: 96 % (07/03/24 2322) Vital Signs (24h Range):  Temp:  [99.2 °F (37.3 °C)-101.7 °F (38.7 °C)] 99.2 °F (37.3 °C)  Pulse:  [] 96  Resp:  [16-30] 16  SpO2:  [96 %-98 %] 96 %  BP: (140-141)/(72-88) 140/88     Weight: 63.5 kg (140 lb)  Body mass index is 22.6 kg/m².     Physical Exam  Vitals and nursing note reviewed.   Constitutional:       General: He is not in acute distress.     Appearance: He is well-developed. He is not diaphoretic.      Comments: Disheveled appearance   HENT:      Head: Normocephalic and atraumatic.      Mouth/Throat:      Mouth: Mucous membranes are moist.   Neck:      Vascular: No JVD.   Cardiovascular:      Rate and Rhythm: Normal rate and regular rhythm.   Pulmonary:      Effort: Pulmonary effort is normal. No respiratory distress.   Abdominal:      General: There is no distension.      Tenderness: There is no abdominal tenderness.   Skin:     Coloration: Skin is not jaundiced or pale.      Findings: No rash.      Comments: Dry, cracked skin on bilateral feet   Neurological:      Motor: No abnormal muscle tone.      Comments: Moves all extremities equally, does not attempt to open eyes or engage in conversation, no obvious tremor or seizure activity    Psychiatric:         Speech: He is noncommunicative.         Behavior: Behavior is slowed.                Significant Labs: All pertinent labs within the past 24 hours have been reviewed.  CBC:   Recent Labs   Lab 07/03/24  2208   WBC 9.55   HGB 15.2   HCT 45.3        CMP:   Recent Labs   Lab 07/03/24  2058   *   K 3.7      CO2 24   GLU 69*   BUN 3*   CREATININE 0.9   CALCIUM 8.8   PROT 6.8   ALBUMIN 3.8   BILITOT 0.4   ALKPHOS 65   AST 30   ALT 14   ANIONGAP 12       Significant Imaging: I have reviewed all pertinent imaging results/findings within the past 24 hours.

## 2024-07-04 NOTE — ASSESSMENT & PLAN NOTE
Initially febrile on presentation with tachycardia, however without signs of focal infection.  He has no leukocytosis, and lactate within normal limits.  Highly suspect secondary to toxidrome vs. viral infection.  Check COVID and flu.  We will hold off on antibiotics for now and monitor, with low threshold to initiate if he develops hemodynamic instability.

## 2024-07-05 PROBLEM — F10.10 ETOH ABUSE: Status: ACTIVE | Noted: 2024-07-05

## 2024-07-05 LAB
ALBUMIN SERPL BCP-MCNC: 3.8 G/DL (ref 3.5–5.2)
ALP SERPL-CCNC: 71 U/L (ref 55–135)
ALT SERPL W/O P-5'-P-CCNC: 18 U/L (ref 10–44)
ANION GAP SERPL CALC-SCNC: 12 MMOL/L (ref 8–16)
AST SERPL-CCNC: 30 U/L (ref 10–40)
BILIRUB SERPL-MCNC: 0.7 MG/DL (ref 0.1–1)
BUN SERPL-MCNC: 4 MG/DL (ref 6–20)
BUN SERPL-MCNC: <3 MG/DL (ref 6–30)
CALCIUM SERPL-MCNC: 9.4 MG/DL (ref 8.7–10.5)
CHLORIDE SERPL-SCNC: 106 MMOL/L (ref 95–110)
CHLORIDE SERPL-SCNC: 109 MMOL/L (ref 95–110)
CO2 SERPL-SCNC: 27 MMOL/L (ref 23–29)
CREAT SERPL-MCNC: 0.8 MG/DL (ref 0.5–1.4)
CREAT SERPL-MCNC: 0.9 MG/DL (ref 0.5–1.4)
ERYTHROCYTE [DISTWIDTH] IN BLOOD BY AUTOMATED COUNT: 13.9 % (ref 11.5–14.5)
EST. GFR  (NO RACE VARIABLE): >60 ML/MIN/1.73 M^2
GLUCOSE SERPL-MCNC: 71 MG/DL (ref 70–110)
GLUCOSE SERPL-MCNC: 96 MG/DL (ref 70–110)
HCT VFR BLD AUTO: 48.5 % (ref 40–54)
HCT VFR BLD CALC: 40 %PCV (ref 36–54)
HCV AB SERPL QL IA: NORMAL
HGB BLD-MCNC: 15.7 G/DL (ref 14–18)
HIV 1+2 AB+HIV1 P24 AG SERPL QL IA: NORMAL
MCH RBC QN AUTO: 31 PG (ref 27–31)
MCHC RBC AUTO-ENTMCNC: 32.4 G/DL (ref 32–36)
MCV RBC AUTO: 96 FL (ref 82–98)
PLATELET # BLD AUTO: 266 K/UL (ref 150–450)
PMV BLD AUTO: 10.8 FL (ref 9.2–12.9)
POC IONIZED CALCIUM: 0.98 MMOL/L (ref 1.06–1.42)
POC TCO2 (MEASURED): 26 MMOL/L (ref 23–29)
POCT GLUCOSE: 120 MG/DL (ref 70–110)
POTASSIUM BLD-SCNC: 4 MMOL/L (ref 3.5–5.1)
POTASSIUM SERPL-SCNC: 3.4 MMOL/L (ref 3.5–5.1)
PROT SERPL-MCNC: 7.1 G/DL (ref 6–8.4)
RBC # BLD AUTO: 5.06 M/UL (ref 4.6–6.2)
SAMPLE: ABNORMAL
SODIUM BLD-SCNC: 144 MMOL/L (ref 136–145)
SODIUM SERPL-SCNC: 145 MMOL/L (ref 136–145)
WBC # BLD AUTO: 4.32 K/UL (ref 3.9–12.7)

## 2024-07-05 PROCEDURE — 80053 COMPREHEN METABOLIC PANEL: CPT | Performed by: STUDENT IN AN ORGANIZED HEALTH CARE EDUCATION/TRAINING PROGRAM

## 2024-07-05 PROCEDURE — G0378 HOSPITAL OBSERVATION PER HR: HCPCS

## 2024-07-05 PROCEDURE — 25000003 PHARM REV CODE 250: Performed by: STUDENT IN AN ORGANIZED HEALTH CARE EDUCATION/TRAINING PROGRAM

## 2024-07-05 PROCEDURE — 63600175 PHARM REV CODE 636 W HCPCS: Performed by: STUDENT IN AN ORGANIZED HEALTH CARE EDUCATION/TRAINING PROGRAM

## 2024-07-05 PROCEDURE — 36415 COLL VENOUS BLD VENIPUNCTURE: CPT | Performed by: STUDENT IN AN ORGANIZED HEALTH CARE EDUCATION/TRAINING PROGRAM

## 2024-07-05 PROCEDURE — 96376 TX/PRO/DX INJ SAME DRUG ADON: CPT

## 2024-07-05 PROCEDURE — 86803 HEPATITIS C AB TEST: CPT | Performed by: STUDENT IN AN ORGANIZED HEALTH CARE EDUCATION/TRAINING PROGRAM

## 2024-07-05 PROCEDURE — 90792 PSYCH DIAG EVAL W/MED SRVCS: CPT | Mod: ,,, | Performed by: PSYCHIATRY & NEUROLOGY

## 2024-07-05 PROCEDURE — 85027 COMPLETE CBC AUTOMATED: CPT | Performed by: STUDENT IN AN ORGANIZED HEALTH CARE EDUCATION/TRAINING PROGRAM

## 2024-07-05 PROCEDURE — 87389 HIV-1 AG W/HIV-1&-2 AB AG IA: CPT | Performed by: STUDENT IN AN ORGANIZED HEALTH CARE EDUCATION/TRAINING PROGRAM

## 2024-07-05 RX ORDER — FOLIC ACID 1 MG/1
1 TABLET ORAL DAILY
Status: DISCONTINUED | OUTPATIENT
Start: 2024-07-05 | End: 2024-07-07 | Stop reason: HOSPADM

## 2024-07-05 RX ORDER — ONDANSETRON 4 MG/1
4 TABLET, ORALLY DISINTEGRATING ORAL EVERY 6 HOURS PRN
Status: DISCONTINUED | OUTPATIENT
Start: 2024-07-05 | End: 2024-07-07 | Stop reason: HOSPADM

## 2024-07-05 RX ORDER — THIAMINE HCL 100 MG
100 TABLET ORAL DAILY
Status: DISCONTINUED | OUTPATIENT
Start: 2024-07-05 | End: 2024-07-07 | Stop reason: HOSPADM

## 2024-07-05 RX ORDER — LORAZEPAM 2 MG/ML
2 INJECTION INTRAMUSCULAR EVERY 10 MIN PRN
Status: DISCONTINUED | OUTPATIENT
Start: 2024-07-05 | End: 2024-07-07 | Stop reason: HOSPADM

## 2024-07-05 RX ORDER — LORAZEPAM 1 MG/1
2 TABLET ORAL EVERY 4 HOURS PRN
Status: DISCONTINUED | OUTPATIENT
Start: 2024-07-05 | End: 2024-07-06

## 2024-07-05 RX ORDER — POTASSIUM CHLORIDE 20 MEQ/1
20 TABLET, EXTENDED RELEASE ORAL ONCE
Status: COMPLETED | OUTPATIENT
Start: 2024-07-05 | End: 2024-07-05

## 2024-07-05 RX ADMIN — LORAZEPAM 2 MG: 2 INJECTION INTRAMUSCULAR; INTRAVENOUS at 11:07

## 2024-07-05 RX ADMIN — Medication 100 MG: at 10:07

## 2024-07-05 RX ADMIN — FOLIC ACID 1 MG: 1 TABLET ORAL at 10:07

## 2024-07-05 RX ADMIN — POTASSIUM CHLORIDE 20 MEQ: 1500 TABLET, EXTENDED RELEASE ORAL at 08:07

## 2024-07-05 RX ADMIN — THERA TABS 1 TABLET: TAB at 10:07

## 2024-07-05 NOTE — PLAN OF CARE
Medical team at bedside; unable to complete assessment      SW/CM to follow-up        Karly Lovelace CD, MSW, LMSW, RSW   Case Management  [facility]  Email: [email or web address]

## 2024-07-05 NOTE — HOSPITAL COURSE
7/4: Upon my evaluation, patient denies all symptoms. Unable to tell me what he remembers prior to coming to the hospital. He is oriented to situation and time. Denies drug use. Patient is unconcerned with events that lead to his hospitalization. When asked if he has a place to go when he leaves the hospital, he reported that he stays with his mother. Reports that his mother is aware that he is in the hospital but he does not want his mother to be updated in regards to his care. Denies any medical problems. Patient denies having ever seen a doctor in his life, including a pediatrician as a child. Patient denies SI or HI. Denies auditory or visual hallucinations. On physical exam, patient noted to have scars on his chest and abdomen. I am concerned about this patient's medical decision making capacity. Psychiatry has been consulted for assistance.    Psychiatry met with patient evening of 7/4 - patient endorsed psychiatric history with recent IP stay, manic symptoms, and hallucinations.     7/5: Patient much more willing to discuss previous medical history. 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 He is still interested in inpatient psychiatric treatment. CIWA of 7 on my assessment, mild withdrawal.     Patient assessed by 's office following morning rounds. Patient CEC'd based on that evaluation. Patient seen by psychiatry shortly after receiving IV ativan by nursing, when PO should have been administered, withdrawal VS parameters. Psychiatry did not observe symptoms of keesha at that time.     7/6: Patient continues to display manic symptoms during my evaluation. He is willing to go to inpatient psychiatry if that means he will leave this hospital today. He does not have a plan on where is going to go upon discharge, although he requests discharge repeatedly during interview. When I discussed with patient my concern that he may have withdrawal  seizures if he were to not be in a medical facility, he stated he did not think that would happen. While patient denies all symptoms on my interview today, he has been inconsistent with participation and has minimized symptoms during this hospitalization.Psychiatry has been re-engaged for evaluation of patient today.     7/7: Patient pacing with strong urge to walk the hallways. Focused on leaving the hospital. He denies difficulty sleeping last night. Earlier in AM received PO ativan for CIWA>8, headache and anxiety. On my evaluation, denies headache, endorses worsening of baseline anxiety. Patient medically cleared for transfer to inpatient psychiatric facility where he would be under medical observation for further withdrawal symptoms. Patient has history of withdrawal seizures and is still within the window for this risk.

## 2024-07-05 NOTE — PLAN OF CARE
Problem: Fall Injury Risk  Goal: Absence of Fall and Fall-Related Injury  Outcome: Progressing     Problem: Adult Inpatient Plan of Care  Goal: Plan of Care Review  Outcome: Progressing  Goal: Patient-Specific Goal (Individualized)  Outcome: Progressing  Goal: Absence of Hospital-Acquired Illness or Injury  Outcome: Progressing  Goal: Optimal Comfort and Wellbeing  Outcome: Progressing  Goal: Readiness for Transition of Care  Outcome: Progressing     Problem: Skin Injury Risk Increased  Goal: Skin Health and Integrity  Outcome: Progressing

## 2024-07-05 NOTE — PLAN OF CARE
Pt has no insurance     PT was amenable to SW emailing [employer]    SW sent email to [employer] to determine medicaid eligibility      Karly Lovelace CD, MSW, LMSW, RSW   Case Management  [facility]  Email: [email or web address]

## 2024-07-05 NOTE — CONSULTS
CONSULTATION LIAISON PSYCHIATRY INITIAL EVALUATION    Patient Name: Beka Davis  MRN: [id number]  Patient Class: OP- Observation  Admission Date: 7/3/2024  Attending Physician: Tomasa Crowder MD      HPI:   Beka Davis is a 18 y.o. male with past psychiatric history of bipolar disorder and opioid use disorder presents to the ED/admitted to the hospital for Toxic metabolic encephalopathy    Psychiatry consulted for Grave disability and paranoia    On psych exam, patient exhibited a fixed intense gaze to interviewer. Patient initially vague on questioning, but eventually became more forth coming throughout interview. He appears manic on interview. Patient reported that he was at [facility] 2 months prior for a manic episode. He was placed on lithium and suboxone. Patient has since been homeless in [place] until he was brought to [facility] for being found down under someone's truck. Patient has poor memory about the events leading up to hospitalization. He reports decreased sleep, difficulty concentrating, visual hallucinations, and suicidal ideation. Patient then requested to go back to [facility] for further treatment. Reports that he has not been compliant with his Lithium or suboxone.     Patient was previously using IV heroin daily for 2 years abut has since cut back due to being on suboxone. Patient denied IV drug use for the past 2 months. Not interested in Rehab at this time. Denied having family in the area.       Collateral with patient's permission:   Patient did not want collateral contacted    Medical Review of Systems:  Pertinent items are noted in HPI.    Psychiatric Review of Systems (is patient experiencing or having changes in):  sleep: yes  appetite: yes  weight: no  energy/anergy: yes  interest/pleasure/anhedonia: no  somatic symptoms: no  libido: no  anxiety/panic: yes  guilty/hopelessness: no  concentration: yes  Natalie:yes  Psychosis: yes  Trauma: no  S.I.B.s/risky behavior: no    Past  "Psychiatric History:  Previous Medication Trials: yes, Lithium and Suboxone  Previous Psychiatric Hospitalizations:yes, 2 months ago at [facility]   Previous Suicide Attempts: denies  History of Violence: denies  Outpatient Psychiatrist: reported yes but could not remember name or associated facility  Family Psychiatric History: yes    Substance Abuse History (with emphasis over the last 12 months):  Recreational Drugs: heroin  Use of Alcohol: denied  Tobacco Use:no  Rehab History:yes    Social History:  Marital Status: single  Children: 0  Employment Status/Info:  unemployed  :no  Education: high school diploma/GED  Special Ed: no  Housing Status: homeless  Access to gun: no  Psychosocial Stressors: financial, drug and alcohol, and housing  Functioning Relationships: alone & isolated    Legal History:  Past Charges/Incarcerations: denied  Pending charges: denied    Mental Status Exam:  General Appearance: {XX-Appearance:59508}  Behavior: {XX-Behavior:34295}  Involuntary Movements and Motor Activity: {XX-Movements:64258}  Gait and Station: {XX-Gait:48613}  Speech and Language: {XX-Speech&Language:38477}  Mood: "***"  Affect: {XX-Affect:26476}  Thought Process and Associations: {XX-TP&Associations:25256}  Thought Content and Perceptions:: {XX-ThoughtContent:95829}  Sensorium and Orientation: {XX-Orientation&Sensorium:94110}  Recent and Remote Memory: {XX-Memory:73863}  Attention and Concentration: {XX-Attention:59974}  Fund of Knowledge: {XX-FundOfKnowledge:60876}  Insight: {XX-Insight:87395}  Judgment: {XX-Judgment:82499}    CAM ICU positive? {YES/NO/WILD CARDS:52945}      ASSESSMENT & RECOMMENDATIONS   (Please list each relevant SPECIFIC psychiatric DSMV or medical diagnosis and recs for it under the listing DO NOT WRITE AN IMPRESSION PARAGRAPH!!)    MDD mild/moderate/severe, BIPOLAR I/II, Unspecified mood etc  PSYCH MEDICATIONS  Scheduled  PRN  OR doesn't warrant any med management in the inpatient setting " defer to outpatient    SUSAN/panic d/o, adjustment d/o etc  PSYCH MEDICATIONS  Scheduled  PRN  OR doesn't warrant any med management in the inpatient setting defer to outpatient    Schizophrenia, schizoaffective, delusional d/o, acute psychosis etc  PSYCH MEDICATIONS  Scheduled  PRN  OR doesn't warrant any med management in the inpatient setting defer to outpatient    Dementia, parkinson's, pseudo dementia etc  PSYCH MEDICATIONS  Scheduled  PRN  OR doesn't warrant any med management in the inpatient setting defer to outpatient    DELIRIUM  DELIRIUM BEHAVIOR MANAGEMENT  PLEASE utilize CHEMICAL restraints with PRN meds first for agitation. Minimize use of PHYSICAL restraints OR have periods of being out of physical restraints if possible.  Keep window shades open and room lit during day and room dim at night in order to promote normal sleep-wake cycles  Encourage family at bedside. [place] patient often to situation, location, date.  Continue to Limit or Discontinue use of Narcotics, Benzos and Anti-cholinergic medications as they may worsen delirium.  Continue medical workup for causative etiology of Delirium.     OTHER PERTINENT DIAGNOSIS    RISK ASSESSMENT  NEEDS PEC because patient is in imminent danger of hurting self or others and is gravely disabled. & NEEDS 1:1 sitter  NO NEED FOR PEC patient NOT in any imminent danger of hurting self or others and not gravely disabled.     FOLLOW UP  Will follow up while in house  Will sign off. Patient can follow up with ***/outpatient mental health provider. Resources provided in patient's discharge instructions.    DISPOSITION - once medically cleared:   Seek involuntary inpatient psychiatric admission for stabilization of acute psychiatric symptoms and a safe disposition plan is enacted. The patient &/or their family was informed that the patient will be transferred to an inpatient unit per ED/primary placement team.   OR  Patient may be discharged home with next of kin with  outpatient psychaitric follow up/rehab.   OR  Defer to medical team    Please contact ON CALL psychiatry service (24/7) for any acute issues that may arise.    Dr. David Moses  CL Psychiatry  [facility]  7/4/2024 7:08 PM        --------------------------------------------------------------------------------------------------------------------------------------------------------------------------------------------------------------------------------------    CONTINUED HISTORY & OBJECTIVE clinical data & findings reviewed and noted for above decision making    Past Medical/Surgical History:   History reviewed. No pertinent past medical history.  History reviewed. No pertinent surgical history.    Current Medications:   Scheduled Meds:   PRN Meds:   Current Facility-Administered Medications:     acetaminophen, 650 mg, Oral, Q8H PRN    acetaminophen, 650 mg, Oral, Q4H PRN    aluminum-magnesium hydroxide-simethicone, 30 mL, Oral, QID PRN    glucagon (human recombinant), 1 mg, Intramuscular, PRN    glucose, 16 g, Oral, PRN    glucose, 24 g, Oral, PRN    melatonin, 6 mg, Oral, Nightly PRN    naloxone, 0.02 mg, Intravenous, PRN    ondansetron, 8 mg, Oral, Q8H PRN    polyethylene glycol, 17 g, Oral, Daily PRN    simethicone, 1 tablet, Oral, QID PRN    sodium chloride 0.9%, 1-10 mL, Intravenous, Q12H PRN    Allergies:   Review of patient's allergies indicates:  No Known Allergies    Vitals  Vitals:    07/04/24 1720   BP:    Pulse: 102   Resp:    Temp:        Labs/Imaging/Studies:  Recent Results (from the past 24 hour(s))   Blood culture x two cultures. Draw prior to antibiotics.    Collection Time: 07/03/24  8:58 PM    Specimen: Peripheral, Antecubital, Right; Blood   Result Value Ref Range    Blood Culture, Routine No Growth to date    Blood culture x two cultures. Draw prior to antibiotics.    Collection Time: 07/03/24  8:58 PM    Specimen: Peripheral, Antecubital, Right; Blood   Result Value Ref  Range    Blood Culture, Routine No Growth to date    Comprehensive metabolic panel    Collection Time: 07/03/24  8:58 PM   Result Value Ref Range    Sodium 146 (H) 136 - 145 mmol/L    Potassium 3.7 3.5 - 5.1 mmol/L    Chloride 110 95 - 110 mmol/L    CO2 24 23 - 29 mmol/L    Glucose 69 (L) 70 - 110 mg/dL    BUN 3 (L) 6 - 20 mg/dL    Creatinine 0.9 0.5 - 1.4 mg/dL    Calcium 8.8 8.7 - 10.5 mg/dL    Total Protein 6.8 6.0 - 8.4 g/dL    Albumin 3.8 3.5 - 5.2 g/dL    Total Bilirubin 0.4 0.1 - 1.0 mg/dL    Alkaline Phosphatase 65 55 - 135 U/L    AST 30 10 - 40 U/L    ALT 14 10 - 44 U/L    eGFR >60.0 >60 mL/min/1.73 m^2    Anion Gap 12 8 - 16 mmol/L   Procalcitonin    Collection Time: 07/03/24  8:58 PM   Result Value Ref Range    Procalcitonin <0.02 <0.25 ng/mL   CK    Collection Time: 07/03/24  8:58 PM   Result Value Ref Range    CPK 1717 (H) 20 - 200 U/L   Protime-INR    Collection Time: 07/03/24  8:58 PM   Result Value Ref Range    Prothrombin Time 12.7 (H) 9.0 - 12.5 sec    INR 1.2 0.8 - 1.2   APTT    Collection Time: 07/03/24  8:58 PM   Result Value Ref Range    aPTT 22.3 21.0 - 32.0 sec   Magnesium    Collection Time: 07/03/24  8:58 PM   Result Value Ref Range    Magnesium 1.9 1.6 - 2.6 mg/dL   TSH    Collection Time: 07/03/24  8:58 PM   Result Value Ref Range    TSH 1.414 0.400 - 4.000 uIU/mL   Ethanol    Collection Time: 07/03/24  8:58 PM   Result Value Ref Range    Alcohol, Serum <10 <10 mg/dL   Acetaminophen level    Collection Time: 07/03/24  8:58 PM   Result Value Ref Range    Acetaminophen (Tylenol), Serum <3.0 (L) 10.0 - 20.0 ug/mL   ISTAT PROCEDURE    Collection Time: 07/03/24  9:22 PM   Result Value Ref Range    POC PH 7.524 (H) 7.35 - 7.45    POC PCO2 32.1 (L) 35 - 45 mmHg    POC PO2 77 (HH) 40 - 60 mmHg    POC HCO3 26.4 24 - 28 mmol/L    POC BE 4 (H) -2 to 2 mmol/L    POC SATURATED O2 97 95 - 100 %    POC TCO2 27 24 - 29 mmol/L    Sample VENOUS     Site Other     Allens Test N/A     DelSys Room Air     ISTAT Lactate    Collection Time: 07/03/24  9:27 PM   Result Value Ref Range    POC Lactate 1.98 0.5 - 2.2 mmol/L    Sample VENOUS     Site Other     Allens Test N/A     DelSys Room Air    EKG 12-lead    Collection Time: 07/03/24  9:56 PM   Result Value Ref Range    QRS Duration 102 ms    [facility] QTC Calculation 463 ms   CBC auto differential    Collection Time: 07/03/24 10:08 PM   Result Value Ref Range    WBC 9.55 3.90 - 12.70 K/uL    RBC 4.79 4.60 - 6.20 M/uL    Hemoglobin 15.2 14.0 - 18.0 g/dL    Hematocrit 45.3 40.0 - 54.0 %    MCV 95 82 - 98 fL    MCH 31.7 (H) 27.0 - 31.0 pg    MCHC 33.6 32.0 - 36.0 g/dL    RDW 14.0 11.5 - 14.5 %    Platelets 253 150 - 450 K/uL    MPV 10.3 9.2 - 12.9 fL    Immature Granulocytes 0.1 0.0 - 0.5 %    Gran # (ANC) 6.7 1.8 - 7.7 K/uL    Immature Grans (Abs) 0.01 0.00 - 0.04 K/uL    Lymph # 1.9 1.0 - 4.8 K/uL    Mono # 0.9 0.3 - 1.0 K/uL    Eos # 0.0 0.0 - 0.5 K/uL    Baso # 0.03 0.00 - 0.20 K/uL    nRBC 0 0 /100 WBC    Gran % 70.5 38.0 - 73.0 %    Lymph % 19.7 18.0 - 48.0 %    Mono % 9.4 4.0 - 15.0 %    Eosinophil % 0.0 0.0 - 8.0 %    Basophil % 0.3 0.0 - 1.9 %    Differential Method Automated    EKG 12-lead    Collection Time: 07/04/24 12:14 AM   Result Value Ref Range    QRS Duration 102 ms    [facility] QTC Calculation 463 ms   SARS-Cov2 (COVID) with FLU/RSV by PCR    Collection Time: 07/04/24  1:38 AM   Result Value Ref Range    SARS-CoV2 (COVID-19) Qualitative PCR Not Detected Not Detected    Influenza A, Molecular Not Detected Not Detected    Influenza B, Molecular Not Detected Not Detected    RSV Ag by Molecular Method Not Detected Not Detected   Urinalysis, Reflex to Urine Culture Urine, Clean Catch    Collection Time: 07/04/24  2:11 AM    Specimen: Urine   Result Value Ref Range    Specimen UA Urine, Clean Catch     Color, UA Colorless (A) Yellow, Straw, Mitzy    Appearance, UA Clear Clear    pH, UA 7.0 5.0 - 8.0    Specific Gravity, UA 1.020 1.005 - 1.030    Protein, UA Negative  Negative    Glucose, UA Negative Negative    Ketones, UA Negative Negative    Bilirubin (UA) Negative Negative    Occult Blood UA Negative Negative    Nitrite, UA Negative Negative    Leukocytes, UA Negative Negative   Drug screen panel, emergency    Collection Time: 07/04/24  2:11 AM   Result Value Ref Range    Benzodiazepines Negative Negative    Methadone metabolites Negative Negative    Cocaine (Metab.) Negative Negative    Opiate Scrn, Ur Negative Negative    Barbiturate Screen, Ur Negative Negative    Amphetamine Screen, Ur Negative Negative    THC Presumptive Positive (A) Negative    Phencyclidine Negative Negative    Creatinine, Urine 31.0 23.0 - 375.0 mg/dL    Toxicology Information SEE COMMENT      Imaging Results              CTA Head and Neck (xpd) (Final result)  Result time 07/03/24 23:49:58      Final result by Chon Kearns MD (07/03/24 23:49:58)                   Impression:      Examination is significantly degraded secondary to motion.    No CT evidence of large territorial infarction.  MRI of the brain may be obtained further evaluation    No acute vascular abnormality in the neck and brain, allowing for motion limitations.  Suggest repeat examination, when the patient is able tolerate positioning.    Thyroid nodules.  Outpatient thyroid ultrasound is suggested for further evaluation.      Electronically signed by: Chon Kearns MD  Date:    07/03/2024  Time:    23:49               Narrative:    EXAMINATION:  CTA HEAD AND NECK (XPD)    CLINICAL HISTORY:  Neuro deficit, acute, stroke suspected;AMS and potential strangulation injury;    TECHNIQUE:  Non contrast low dose axial images were obtained through the head.  CT angiogram was performed from the level of the phoenix to the top of the head following the IV administration of 75mL of Omnipaque 350.   Sagittal and coronal reconstructions and maximum intensity projection reconstructions were performed. Arterial stenosis percentages are based on  NASCET measurement criteria.    CT source data was analyzed using artificial intelligence software for detection of a large vessel occlusions (LVO) in order to enable computer assisted triage notification and aid clinical stroke decision making.    COMPARISON:  None    FINDINGS:  Noncontrast CT head:    The subcutaneous tissues are unremarkable.  The bony calvarium is intact.  The paranasal sinuses are unremarkable.  The mastoid air cells are clear.  The orbits and intraorbital contents are unremarkable.    The craniocervical junction is intact.  The sellar and parasellar structures are unremarkable.  There is no evidence of intracranial hemorrhage.  The ventricles and sulci are within normal limits.  The cisterns are unremarkable.  The gray-white differentiation is maintained.  There is no dense vessel sign.  There is no evidence of mass effect.    CTA neck:    The visualized pulmonary tree is within normal limits.  The great vessels arising from the aortic arch are within normal limits.  The visualized subclavian arteries are within normal limits.    There are some motion limitations at the base of the neck.  Overall evaluation of the carotids at the base of the neck is significantly limited.    The vertebral artery origins are unremarkable.  The vertebral arteries appear normal in caliber.    No definite evidence of dissection or aneurysm of the neck vessels.    CTA head:    The intracranial segments of the ICA appear grossly unremarkable.  The anterior cerebral arteries appear grossly intact.  The middle cerebral arteries are unremarkable.    The basilar is unremarkable.  The posterior cerebral arteries are unremarkable.    The dural venous sinuses are within normal limits.    There is no abnormal intracranial enhancement.    Additional findings:    There are subcentimeter bilateral thyroid nodules.  The remainder of the soft tissue the neck are within normal limits.    The visualized lung apices are  unremarkable.  There is no evidence of a pneumothorax.                                       X-Ray Chest AP Portable (Final result)  Result time 07/03/24 20:17:35      Final result by Nigel Woods MD (07/03/24 20:17:35)                   Impression:      1. Coarse interstitial attenuation noting patient is rotated.  Edema or other nonspecific pneumonitis are considerations.  Correlation is advised.      Electronically signed by: Nigel Woods MD  Date:    07/03/2024  Time:    20:17               Narrative:    EXAMINATION:  XR CHEST AP PORTABLE    CLINICAL HISTORY:  Sepsis;    TECHNIQUE:  Single frontal view of the chest was performed.    COMPARISON:  None    FINDINGS:  Patient is rotated.    The cardiomediastinal silhouette is not enlarged.  There is no pleural effusion.  The trachea is midline.  The lungs are symmetrically expanded bilaterally with coarse interstitial attenuation.  No large focal consolidation seen.  There is no pneumothorax.  The osseous structures are unremarkable.

## 2024-07-05 NOTE — ASSESSMENT & PLAN NOTE
Resolved  Presents with reported confusion then agitation after being found under a truck. Initially oriented to person, but is not answering questions on my exam after receiving sedation in the ED. Workup overall unrevealing for obvious etiology, including CT head.  Urine drug screen pending; highly suspect illicit substance use leading to agitation and encephalopathy.  Monitor for signs of illicit substance withdrawal, and treat with supportive care.  Of note, he was PEC'd by the ED due to agitation.   Patient not forthcoming on evaluation 7/4 with HM as described in hospital course. Concern for grave disability.   - psych consulted   Recent psychiatric hospitalization    Keep PEC in place   Anticipate transfer to inpatient psych facility

## 2024-07-05 NOTE — ASSESSMENT & PLAN NOTE
Resolved  Initially febrile on presentation with tachycardia, however without signs of focal infection.  He has no leukocytosis, and lactate within normal limits.  Infectious work-up negative. Suspect fever was due to prolonged heat exposure.
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
## 2024-07-05 NOTE — PROGRESS NOTES
[facility] Medicine  Progress Note    Patient Name: Beka Davis  MRN: [id number]  Patient Class: OP- Observation   Admission Date: 7/3/2024  Length of Stay: 0 days  Attending Physician: Tomasa Crowder MD  Primary Care Provider: Anna, Primary Doctor        Subjective:     Principal Problem:Toxic metabolic encephalopathy        HPI:  Beka Davis is a 18 y.o. male with unknown medical history who presents after being found under a truck.    Due to receiving sedation in the ED for agitation, he was unable to provide a history.    Per the ED, he was brought in by EMS after someone found him under a semi truck that he had been staying under.  He was reportedly oriented to person on arrival here, however agitated.  Received Ativan and Haldol, and now we will not participate in questioning or exam.  He was initially febrile and tachycardic.  Workup overall unremarkable for obvious etiology, therefore Hospital Medicine was consulted for admission.  He was PEC'd by the ED.    Overview/Hospital Course:  7/4: Upon my evaluation, patient denies all symptoms. Unable to tell me what he remembers prior to coming to the hospital. He is oriented to situation and time. Denies drug use. Patient is unconcerned with events that lead to his hospitalization. When asked if he has a place to go when he leaves the hospital, he reported that he stays with his mother. Reports that his mother is aware that he is in the hospital but he does not want his mother to be updated in regards to his care. Denies any medical problems. Patient denies having ever seen a doctor in his life, including a pediatrician as a child. Patient denies SI or HI. Denies auditory or visual hallucinations. On physical exam, patient noted to have scars on his chest and abdomen. I am concerned about this patient's medical decision making capacity. Psychiatry has been consulted for assistance.    Psychiatry met with patient evening of 7/4 - patient  endorsed psychiatric history with recent IP stay, manic symptoms, and hallucinations.     7/5: Patient much more willing to discuss previous medical history. 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 He is still interested in inpatient psychiatric treatment. CIWA of 7 on my assessment, mild withdrawal. Patient is clear for inpatient psychiatric placement from a medical standpoint.     Interval History: See above    Review of Systems  Objective:     Vital Signs (Most Recent):  Temp: 98.4 °F (36.9 °C) (07/05/24 0842)  Pulse: 67 (07/05/24 0842)  Resp: 18 (07/05/24 0842)  BP: (!) 130/90 (07/05/24 0842)  SpO2: 98 % (07/05/24 0842) Vital Signs (24h Range):  Temp:  [98 °F (36.7 °C)-99 °F (37.2 °C)] 98.4 °F (36.9 °C)  Pulse:  [] 67  Resp:  [16-18] 18  SpO2:  [95 %-99 %] 98 %  BP: (122-142)/(81-98) 130/90     Weight: 63.5 kg (140 lb)  Body mass index is 22.6 kg/m².  No intake or output data in the 24 hours ending 07/05/24 1025      Physical Exam  Vitals and nursing note reviewed.   Constitutional:       General: He is not in acute distress.     Appearance: He is not ill-appearing.   HENT:      Head: Normocephalic.   Eyes:      General: No scleral icterus.  Cardiovascular:      Rate and Rhythm: Normal rate and regular rhythm.      Heart sounds: Normal heart sounds. No murmur heard.  Pulmonary:      Effort: No respiratory distress.      Breath sounds: Normal breath sounds. No wheezing.   Abdominal:      General: Abdomen is flat. There is no distension.      Palpations: Abdomen is soft.      Tenderness: There is no abdominal tenderness.   Musculoskeletal:         General: No tenderness.      Right lower leg: No edema.      Left lower leg: No edema.   Skin:     General: Skin is warm and dry.      Comments: Scarring of chest and abdomen   Neurological:      General: No focal deficit present.      Mental Status: He is alert and oriented to person, place, and  time.   Psychiatric:         Attention and Perception: He does not perceive auditory or visual hallucinations.         Mood and Affect: Mood is anxious. Affect is flat.         Speech: Speech is not rapid and pressured.         Behavior: Behavior is not agitated, aggressive or hyperactive. Behavior is cooperative.             ALCOHOL / BENZODIAZEPINE WITHDRAWAL (CIWA-AR)  Beka Davis 2005 [id number]  Date:  7/5/2024 10:26 AM   Nausea and Vomiting 1=Mild Nausea/No Vomiting  Tremor 2-mild tremor  Paroxysmal Sweats 1=barely perceptible sweat, palms moist  Anxiety 2  Agitation 0=normal activity  Tactile Disturbances 1=very mild itching, pins and needles, burning or numbness  Auditory Disturbances 0=None  Visual Disturbances 0=None  Headache 0=None  Orientation and Clouding of Sensorium  n/a  TOTAL CIWA-AR SCORE: 7      Significant Labs: All pertinent labs within the past 24 hours have been reviewed.  CBC:   Recent Labs   Lab 07/03/24  2125 07/03/24  2208 07/05/24  0333   WBC  --  9.55 4.32   HGB  --  15.2 15.7   HCT 40 45.3 48.5   PLT  --  253 266       Significant Imaging: I have reviewed all pertinent imaging results/findings within the past 24 hours.    Assessment/Plan:      * Toxic metabolic encephalopathy  Resolved  Presents with reported confusion then agitation after being found under a truck. Initially oriented to person, but is not answering questions on my exam after receiving sedation in the ED. Workup overall unrevealing for obvious etiology, including CT head.  Urine drug screen pending; highly suspect illicit substance use leading to agitation and encephalopathy.  Monitor for signs of illicit substance withdrawal, and treat with supportive care.  Of note, he was PEC'd by the ED due to agitation.   Patient not forthcoming on evaluation 7/4 with HM as described in hospital course. Concern for grave disability.   - psych consulted   Recent psychiatric hospitalization    Keep PEC in place   Anticipate  transfer to inpatient psych facility     ETOH abuse  Patient reports heavy drinking and history of seizures with withdrawal. Unable to quantify amount of daily alcohol use or last known drink.   - CIWA q4h  - seizure and fall precautions  - Ativan 2 mg PO q4hrs prn with parameters for withdrawal: tremors, SBP >160, DBP >100; HOLD for somnolence or RR <12 or SBP <100  - Ativan 2 mg IM q10m prn for seizures  - Multivitamin 1 tablet daily   - Folic acid 1 mg oral daily   - thiamine daily   - psych consulted    Elevated CPK  CPK mildly elevated at 1717, without MAHESH or elevated LFTs, therefore does not represent rhabdomyolysis.  Continue IV fluids.      Thyroid nodule  Thyroid nodules noted on CT.  TSH within normal limits.  Recommend outpatient monitoring.      SIRS (systemic inflammatory response syndrome)  Resolved  Initially febrile on presentation with tachycardia, however without signs of focal infection.  He has no leukocytosis, and lactate within normal limits.  Infectious work-up negative. Suspect fever was due to prolonged heat exposure.       Respiratory alkalosis  Respiratory alkalosis noted on VBG, likely secondary to illicit substance use/ overdose.  Monitor.      Homelessness  Reportedly homeless, complicating his health.        VTE Risk Mitigation (From admission, onward)           Ordered     IP VTE LOW RISK PATIENT  Once         07/04/24 0045     Place sequential compression device  Until discontinued         07/04/24 0045                    Discharge Planning   DARWIN: 7/5/2024     Code Status: Full Code   Is the patient medically ready for discharge?:     Reason for patient still in hospital (select all that apply): Pending disposition                     Tomasa Crowder MD  Department of Hospital Medicine   [facility] S[facility]

## 2024-07-05 NOTE — PLAN OF CARE
[facility] - Med Surg  Initial Discharge Assessment       Primary Care Provider: No, Primary Doctor    Admission Diagnosis: Altered mental status [R41.82]  Chest pain [R07.9]  Drug intoxication without complication [F19.920]  At risk for prolonged QT interval syndrome [Z91.89]    Admission Date: 7/3/2024  Expected Discharge Date: 7/5/2024    Pt stated he is independent with his ambulation and ADL's and does not require assistance or equipment.    Pt is currently under a PEC/CEC and will d/c to a psych hospital or shelter or return to the neighbourhood he likes to hang out in    Transition of Care Barriers: (P) Unisured, Does not adhere to care plan, Homeless, Mental illness, Substance Abuse    Payor: /     No emergency contact information on file.    Discharge Plan A: (P) Shelter, Psychiatric hospital  Discharge Plan B: (P) Shelter    No Pharmacies Listed    Initial Assessment (most recent)       Adult Discharge Assessment - 07/05/24 1400          Discharge Assessment    Assessment Type Discharge Planning Assessment (P)      Confirmed/corrected address, phone number and insurance -- (P)    pt is of no fixed address    Source of Information patient (P)      People in Home alone (P)      Facility Arrived From: home (P)      Do you expect to return to your current living situation? Yes (P)      Do you have help at home or someone to help you manage your care at home? No (P)      Prior to hospitilization cognitive status: Alert/Oriented;No Deficits (P)      Current cognitive status: No Deficits;Alert/Oriented (P)      Walking or Climbing Stairs Difficulty no (P)      Dressing/Bathing Difficulty no (P)      Home Accessibility wheelchair accessible (P)      Home Layout Able to live on 1st floor (P)      Equipment Currently Used at Home none (P)      Patient currently being followed by outpatient case management? No (P)      Do you currently have service(s) that help you manage your care at home? No (P)      Do you have any  problems affording any of your prescribed medications? No (P)      Is the patient taking medications as prescribed? yes (P)      How do you get to doctors appointments? other (see comments) (P)    pt does not go - dylon rico has no insurance    Are you on dialysis? No (P)      Do you take coumadin? No (P)      Discharge Plan A Shelter;Psychiatric hospital (P)      Discharge Plan B Shelter (P)      DME Needed Upon Discharge  none (P)      Discharge Plan discussed with: Patient (P)      Transition of Care Barriers Unisured;Does not adhere to care plan;Homeless;Mental illness;Substance Abuse (P)         Alcohol Use    Q1: How often do you have a drink containing alcohol? 4 or more times a week (P)      Q2: How many drinks containing alcohol do you have on a typical day when you are drinking? 10 or more (P)      Q3: How often do you have six or more drinks on one occasion? Daily or almost daily (P)                    Karly Lovelace CD, MSW, LMSW, RSW   Case Management  [facility]  Email: [email or web address]
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
## 2024-07-05 NOTE — DISCHARGE INSTRUCTIONS
[facility] Outpatient Clinics  - [facility]: [address] [place], [phone]  - [facility]: [address] [facility], [phone]  - [facility]: [address], [place], [phone]  - [facility] at [facility]: [address] [facility], [phone]  - [facility] ([facility]): [address], [phone]  - [facility] ([facility]): [address] [facility] [address], [phone]  - Stacy Gonzalez ([place]): [phone]  - [facility] [phone]     [facility] and Private Outpatient Clinics  - [facility]: [address] 4th floor, [phone]  - [facility]: [address] [facility] ([facility], 3rd Floor), [facility], [phone]  - [facility]: [address] [facility], [phone], [phone]  - [facility]: [address], [address], [phone]    Outpatient Group Therapy  - Cancer Support Group: [facility], [address] [facility], Ken Ricardo, [phone]  - Depression/Bipolar Support [place]: [facility], [address], [place], 7:30pm 1st & 3rd Tuesdays in cafeteria, [phone]  - Heart Transplant Support Group: [facility], 3rd Wednesday, 7th floor conference room, Yanni Paul, [phone]  - [facility] [place] [facility] ([facility]): [address] [place] [address], [place], [phone]  - [facility]: [address], Kiarra Michael, [phone]    Outpatient Drug Rehab   - [facility]: [address], 4th Floor, Josephamado Thomas, [phone]  - Alcoholics Anonymous: www.[facility].org, 24 Hr Hotline:830.738.5251, Main: [phone]  - [facility]: [address], [phone], Tuesdays at 10am, Jesse Cano (ext. [phone])  - [facility]stance Abuse Clinic: [address], [phone]  - [facility]: [address], [phone]  - [facility]: [address], [phone]    Inpatient Drug Rehab  - [facility]: [address] [facility], [phone]   - [facility]: [address] [facility], [phone]   - [facility]: [address], [place], [phone]  - [facility]: [phone]  - [facility] <females only> (Levine, Susan. \[facility]ospital [facility] a New [facility] and Outlook.\""): [address] [facility], [phone], ext [phone], Nalini Lovelace  - [facility] <Fee based>: [address] [facility], [phone]      Child Outpatient Psychiatry  - [facility]: [address], [address], [phone]  - [facility]: [address], Dr. Gastelum, [phone], [phone]    Placement/Shelters:    - [facility]: - [address], [place] [address] - Call [phone]   - [facility]  Medicaid funded, call [phone]   - [facility] Housing: family transitional housing, [address] [facility] [facility], call for intake appt, [phone],   - [facility] Home Program, Outreach and Housing Placement, [address] [facility],  Sister Evon Nelson MSW, [phone]  - [facility]: [address] [facility], [phone]  - [facility] (16-24 year old): [address] [facility], [phone]  [facility]: [address]  [phone]  - [facility], [address] [facility]. [phone]  - [facility] (women only): [address], [phone]    HIV/AIDS Placement:  - AIDS Housing / Shelter: [address], [address], [place], [place] [address] - call: [phone] or [phone]  - [facility], Call [phone]   - [facility] [phone]    Health Clinics / Dental Clinics / Indigent Pharmacy  - [facility]: [address] [place] [address] Isela, [phone]  - [facility]: [address] [facility], [phone], Mon-Thur 9am-4pm, Fri 9am-12pm  - [facility]  [facility]inic: [address] [place] [address], [phone]  - [facility]: [address] [facility], [phone], fax [phone]  - [facility] (dental): [address] [place], [place] [phone]  - [facility] Office of [facility] STD Clinic, [address] [facility], [phone]  - [facility] (dental): [address], [place], [phone]  - [facility] (Pharmacy): [address], [address], ([facility]), [phone], Mon/Wed 8am-1pm    s Offices:  - [facility] s Office: Dr. Delcid, [phone], [phone]  - [facility] s Office: Dr Mark Guerrier: [phone]    Crisis  - [facility] Hotline, [phone]  - National Suicide Prevention Crisis Hotline: [phone]

## 2024-07-05 NOTE — SUBJECTIVE & OBJECTIVE
Interval History: See above    Review of Systems  Objective:     Vital Signs (Most Recent):  Temp: 98.4 °F (36.9 °C) (07/05/24 0842)  Pulse: 67 (07/05/24 0842)  Resp: 18 (07/05/24 0842)  BP: (!) 130/90 (07/05/24 0842)  SpO2: 98 % (07/05/24 0842) Vital Signs (24h Range):  Temp:  [98 °F (36.7 °C)-99 °F (37.2 °C)] 98.4 °F (36.9 °C)  Pulse:  [] 67  Resp:  [16-18] 18  SpO2:  [95 %-99 %] 98 %  BP: (122-142)/(81-98) 130/90     Weight: 63.5 kg (140 lb)  Body mass index is 22.6 kg/m².  No intake or output data in the 24 hours ending 07/05/24 1025      Physical Exam  Vitals and nursing note reviewed.   Constitutional:       General: He is not in acute distress.     Appearance: He is not ill-appearing.   HENT:      Head: Normocephalic.   Eyes:      General: No scleral icterus.  Cardiovascular:      Rate and Rhythm: Normal rate and regular rhythm.      Heart sounds: Normal heart sounds. No murmur heard.  Pulmonary:      Effort: No respiratory distress.      Breath sounds: Normal breath sounds. No wheezing.   Abdominal:      General: Abdomen is flat. There is no distension.      Palpations: Abdomen is soft.      Tenderness: There is no abdominal tenderness.   Musculoskeletal:         General: No tenderness.      Right lower leg: No edema.      Left lower leg: No edema.   Skin:     General: Skin is warm and dry.      Comments: Scarring of chest and abdomen   Neurological:      General: No focal deficit present.      Mental Status: He is alert and oriented to person, place, and time.   Psychiatric:         Attention and Perception: He does not perceive auditory or visual hallucinations.         Mood and Affect: Mood is anxious. Affect is flat.         Speech: Speech is not rapid and pressured.         Behavior: Behavior is not agitated, aggressive or hyperactive. Behavior is cooperative.             ALCOHOL / BENZODIAZEPINE WITHDRAWAL (CIWA-AR)  Beka Davis 2005 [id number]  Date:  7/5/2024 10:26 AM   Nausea and  Vomiting 1=Mild Nausea/No Vomiting  Tremor 2-mild tremor  Paroxysmal Sweats 1=barely perceptible sweat, palms moist  Anxiety 2  Agitation 0=normal activity  Tactile Disturbances 1=very mild itching, pins and needles, burning or numbness  Auditory Disturbances 0=None  Visual Disturbances 0=None  Headache 0=None  Orientation and Clouding of Sensorium  n/a  TOTAL CIWA-AR SCORE: 7      Significant Labs: All pertinent labs within the past 24 hours have been reviewed.  CBC:   Recent Labs   Lab 07/03/24 2125 07/03/24 2208 07/05/24  0333   WBC  --  9.55 4.32   HGB  --  15.2 15.7   HCT 40 45.3 48.5   PLT  --  253 266       Significant Imaging: I have reviewed all pertinent imaging results/findings within the past 24 hours.

## 2024-07-05 NOTE — ASSESSMENT & PLAN NOTE
Patient reports heavy drinking and history of seizures with withdrawal. Unable to quantify amount of daily alcohol use or last known drink.   - CIWA q4h  - seizure and fall precautions  - Ativan 2 mg PO q4hrs prn with parameters for withdrawal: tremors, SBP >160, DBP >100; HOLD for somnolence or RR <12 or SBP <100  - Ativan 2 mg IM q10m prn for seizures  - Multivitamin 1 tablet daily   - Folic acid 1 mg oral daily   - thiamine daily   - psych consulted

## 2024-07-06 LAB
ALBUMIN SERPL BCP-MCNC: 3.4 G/DL (ref 3.5–5.2)
ALP SERPL-CCNC: 61 U/L (ref 55–135)
ALT SERPL W/O P-5'-P-CCNC: 15 U/L (ref 10–44)
ANION GAP SERPL CALC-SCNC: 12 MMOL/L (ref 8–16)
AST SERPL-CCNC: 18 U/L (ref 10–40)
BILIRUB SERPL-MCNC: 0.4 MG/DL (ref 0.1–1)
BUN SERPL-MCNC: 8 MG/DL (ref 6–20)
CALCIUM SERPL-MCNC: 8.8 MG/DL (ref 8.7–10.5)
CHLORIDE SERPL-SCNC: 106 MMOL/L (ref 95–110)
CO2 SERPL-SCNC: 26 MMOL/L (ref 23–29)
CREAT SERPL-MCNC: 0.8 MG/DL (ref 0.5–1.4)
ERYTHROCYTE [DISTWIDTH] IN BLOOD BY AUTOMATED COUNT: 13.4 % (ref 11.5–14.5)
EST. GFR  (NO RACE VARIABLE): >60 ML/MIN/1.73 M^2
GLUCOSE SERPL-MCNC: 96 MG/DL (ref 70–110)
HCT VFR BLD AUTO: 42.1 % (ref 40–54)
HGB BLD-MCNC: 14.1 G/DL (ref 14–18)
MCH RBC QN AUTO: 31.4 PG (ref 27–31)
MCHC RBC AUTO-ENTMCNC: 33.5 G/DL (ref 32–36)
MCV RBC AUTO: 94 FL (ref 82–98)
PLATELET # BLD AUTO: 235 K/UL (ref 150–450)
PMV BLD AUTO: 10.7 FL (ref 9.2–12.9)
POTASSIUM SERPL-SCNC: 3.3 MMOL/L (ref 3.5–5.1)
PROT SERPL-MCNC: 6.4 G/DL (ref 6–8.4)
RBC # BLD AUTO: 4.49 M/UL (ref 4.6–6.2)
SODIUM SERPL-SCNC: 144 MMOL/L (ref 136–145)
WBC # BLD AUTO: 4.27 K/UL (ref 3.9–12.7)

## 2024-07-06 PROCEDURE — 85027 COMPLETE CBC AUTOMATED: CPT | Performed by: STUDENT IN AN ORGANIZED HEALTH CARE EDUCATION/TRAINING PROGRAM

## 2024-07-06 PROCEDURE — 25000003 PHARM REV CODE 250: Performed by: STUDENT IN AN ORGANIZED HEALTH CARE EDUCATION/TRAINING PROGRAM

## 2024-07-06 PROCEDURE — 36415 COLL VENOUS BLD VENIPUNCTURE: CPT | Performed by: STUDENT IN AN ORGANIZED HEALTH CARE EDUCATION/TRAINING PROGRAM

## 2024-07-06 PROCEDURE — 11000001 HC ACUTE MED/SURG PRIVATE ROOM

## 2024-07-06 PROCEDURE — 80053 COMPREHEN METABOLIC PANEL: CPT | Performed by: STUDENT IN AN ORGANIZED HEALTH CARE EDUCATION/TRAINING PROGRAM

## 2024-07-06 PROCEDURE — 99233 SBSQ HOSP IP/OBS HIGH 50: CPT | Mod: ,,, | Performed by: PSYCHIATRY & NEUROLOGY

## 2024-07-06 RX ORDER — LORAZEPAM 1 MG/1
2 TABLET ORAL EVERY 4 HOURS PRN
Status: DISCONTINUED | OUTPATIENT
Start: 2024-07-06 | End: 2024-07-07 | Stop reason: HOSPADM

## 2024-07-06 RX ORDER — QUETIAPINE FUMARATE 25 MG/1
50 TABLET, FILM COATED ORAL NIGHTLY
Status: DISCONTINUED | OUTPATIENT
Start: 2024-07-06 | End: 2024-07-07 | Stop reason: HOSPADM

## 2024-07-06 RX ORDER — POTASSIUM CHLORIDE 20 MEQ/1
40 TABLET, EXTENDED RELEASE ORAL ONCE
Status: COMPLETED | OUTPATIENT
Start: 2024-07-06 | End: 2024-07-06

## 2024-07-06 RX ADMIN — ONDANSETRON 4 MG: 4 TABLET, ORALLY DISINTEGRATING ORAL at 09:07

## 2024-07-06 RX ADMIN — POTASSIUM CHLORIDE 40 MEQ: 1500 TABLET, EXTENDED RELEASE ORAL at 08:07

## 2024-07-06 RX ADMIN — Medication 100 MG: at 08:07

## 2024-07-06 RX ADMIN — FOLIC ACID 1 MG: 1 TABLET ORAL at 08:07

## 2024-07-06 RX ADMIN — LORAZEPAM 2 MG: 1 TABLET ORAL at 01:07

## 2024-07-06 RX ADMIN — LORAZEPAM 2 MG: 1 TABLET ORAL at 09:07

## 2024-07-06 RX ADMIN — THERA TABS 1 TABLET: TAB at 08:07

## 2024-07-06 NOTE — PROGRESS NOTES
[facility] Medicine  Progress Note    Patient Name: Beka Davis  MRN: [id number]  Patient Class: OP- Observation   Admission Date: 7/3/2024  Length of Stay: 0 days  Attending Physician: Tomasa Crowder MD  Primary Care Provider: Anna, Primary Doctor        Subjective:     Principal Problem:Toxic metabolic encephalopathy        HPI:  Beka Davis is a 18 y.o. male with unknown medical history who presents after being found under a truck.    Due to receiving sedation in the ED for agitation, he was unable to provide a history.    Per the ED, he was brought in by EMS after someone found him under a semi truck that he had been staying under.  He was reportedly oriented to person on arrival here, however agitated.  Received Ativan and Haldol, and now we will not participate in questioning or exam.  He was initially febrile and tachycardic.  Workup overall unremarkable for obvious etiology, therefore Hospital Medicine was consulted for admission.  He was PEC'd by the ED.    Overview/Hospital Course:  7/4: Upon my evaluation, patient denies all symptoms. Unable to tell me what he remembers prior to coming to the hospital. He is oriented to situation and time. Denies drug use. Patient is unconcerned with events that lead to his hospitalization. When asked if he has a place to go when he leaves the hospital, he reported that he stays with his mother. Reports that his mother is aware that he is in the hospital but he does not want his mother to be updated in regards to his care. Denies any medical problems. Patient denies having ever seen a doctor in his life, including a pediatrician as a child. Patient denies SI or HI. Denies auditory or visual hallucinations. On physical exam, patient noted to have scars on his chest and abdomen. I am concerned about this patient's medical decision making capacity. Psychiatry has been consulted for assistance.    Psychiatry met with patient evening of 7/4 - patient  endorsed psychiatric history with recent IP stay, manic symptoms, and hallucinations.     7/5: Patient much more willing to discuss previous medical history. 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 He is still interested in inpatient psychiatric treatment. CIWA of 7 on my assessment, mild withdrawal. Patient is clear for inpatient psychiatric placement from a medical standpoint.     Patient assessed by 's office following morning rounds. Patient CEC'd based on that evaluation. Patient seen by psychiatry shortly after receiving IV ativan by nursing, when PO should have been administered, withdrawal VS parameters. Psychiatry did not observe symptoms of keesha at that time.     7/6: Patient continues to display manic symptoms during my evaluation. He is willing to go to inpatient psychiatry if that means he will leave this hospital today. He does not have a plan on where is going to go upon discharge, although he requests discharge repeatedly during interview. When I discussed with patient my concern that he may have withdrawal seizures if he were to not be in a medical facility, he stated he did not think that would happen. While patient denies all symptoms on my interview today, he has been inconsistent with participation and has minimized symptoms during this hospitalization.Psychiatry has been re-engaged for evaluation of patient today.     Interval History:  see above    Review of Systems  Objective:     Vital Signs (Most Recent):  Temp: 98.2 °F (36.8 °C) (07/06/24 1200)  Pulse: 74 (07/06/24 1200)  Resp: 17 (07/06/24 1200)  BP: 122/82 (07/06/24 1200)  SpO2: 100 % (07/06/24 1200) Vital Signs (24h Range):  Temp:  [97.5 °F (36.4 °C)-99 °F (37.2 °C)] 98.2 °F (36.8 °C)  Pulse:  [63-74] 74  Resp:  [16-18] 17  SpO2:  [98 %-100 %] 100 %  BP: (122-134)/(76-87) 122/82     Weight: 63.5 kg (140 lb)  Body mass index is 22.6 kg/m².  No intake or output data  in the 24 hours ending 07/06/24 [facility]      Physical Exam  Vitals and nursing note reviewed.   Constitutional:       General: He is not in acute distress.     Appearance: He is not ill-appearing.   HENT:      Head: Normocephalic.   Eyes:      General: No scleral icterus.  Cardiovascular:      Rate and Rhythm: Normal rate and regular rhythm.      Heart sounds: Normal heart sounds. No murmur heard.  Pulmonary:      Effort: No respiratory distress.      Breath sounds: Normal breath sounds. No wheezing.   Abdominal:      General: Abdomen is flat. There is no distension.      Palpations: Abdomen is soft.      Tenderness: There is no abdominal tenderness.   Musculoskeletal:         General: No tenderness.      Right lower leg: No edema.      Left lower leg: No edema.   Skin:     General: Skin is warm and dry.      Comments: Scarring of chest and abdomen   Neurological:      General: No focal deficit present.      Mental Status: He is alert and oriented to person, place, and time.   Psychiatric:         Attention and Perception: He does not perceive auditory or visual hallucinations.         Mood and Affect: Affect is flat.         Speech: Speech is rapid and pressured.         Behavior: Behavior is not agitated, aggressive or hyperactive. Behavior is cooperative.         Cognition and Memory: Memory is impaired. He exhibits impaired recent memory.      Comments: Inappropriate eye contact             Significant Labs: All pertinent labs within the past 24 hours have been reviewed.    Significant Imaging: I have reviewed all pertinent imaging results/findings within the past 24 hours.    Assessment/Plan:      * Toxic metabolic encephalopathy  Resolved  Presents with reported confusion then agitation after being found under a truck. Initially oriented to person, but is not answering questions on my exam after receiving sedation in the ED. Workup overall unrevealing for obvious etiology, including CT head.  Urine drug screen  pending; highly suspect illicit substance use leading to agitation and encephalopathy.  Monitor for signs of illicit substance withdrawal, and treat with supportive care.  Of note, he was PEC'd by the ED due to agitation.   Patient not forthcoming on evaluation 7/4 with HM as described in hospital course. Concern for grave disability.   CEC'd for grave disability on 7/5  - psych consulted   Recent psychiatric hospitalization    Keep CEC in place   Anticipate transfer to inpatient psych facility     ETOH abuse  Patient reports heavy drinking and history of seizures with withdrawal. Unable to quantify amount of daily alcohol use or last known drink.   - CIWA q4h  - seizure and fall precautions  - Ativan 2 mg PO q4hrs prn with parameters for withdrawal: tremors, SBP >160, DBP >100; HOLD for somnolence or RR <12 or SBP <100  - Ativan 2 mg IM q10m prn for seizures  - Multivitamin 1 tablet daily   - Folic acid 1 mg oral daily   - thiamine daily   - psych consulted    Elevated CPK  CPK mildly elevated at 1717, without MAHESH or elevated LFTs, therefore does not represent rhabdomyolysis.  Continue IV fluids.      Thyroid nodule  Thyroid nodules noted on CT.  TSH within normal limits.  Recommend outpatient monitoring.      SIRS (systemic inflammatory response syndrome)  Resolved  Initially febrile on presentation with tachycardia, however without signs of focal infection.  He has no leukocytosis, and lactate within normal limits.  Infectious work-up negative. Suspect fever was due to prolonged heat exposure.       Respiratory alkalosis  Respiratory alkalosis noted on VBG, likely secondary to illicit substance use/ overdose.  Monitor.      Homelessness  Reportedly homeless, complicating his health.        VTE Risk Mitigation (From admission, onward)           Ordered     IP VTE LOW RISK PATIENT  Once         07/04/24 0045     Place sequential compression device  Until discontinued         07/04/24 0045                     Discharge Planning   DARWIN: 7/7/2024     Code Status: Full Code   Is the patient medically ready for discharge?:     Reason for patient still in hospital (select all that apply): Patient trending condition, Consult recommendations, and Pending disposition  Discharge Plan A: [facility]                  Tomasa Crowder MD  Department of Hospital Medicine   [facility]

## 2024-07-06 NOTE — SUBJECTIVE & OBJECTIVE
Interval History:  see above    Review of Systems  Objective:     Vital Signs (Most Recent):  Temp: 98.2 °F (36.8 °C) (07/06/24 1200)  Pulse: 74 (07/06/24 1200)  Resp: 17 (07/06/24 1200)  BP: 122/82 (07/06/24 1200)  SpO2: 100 % (07/06/24 1200) Vital Signs (24h Range):  Temp:  [97.5 °F (36.4 °C)-99 °F (37.2 °C)] 98.2 °F (36.8 °C)  Pulse:  [63-74] 74  Resp:  [16-18] 17  SpO2:  [98 %-100 %] 100 %  BP: (122-134)/(76-87) 122/82     Weight: 63.5 kg (140 lb)  Body mass index is 22.6 kg/m².  No intake or output data in the 24 hours ending 07/06/24 1257      Physical Exam  Vitals and nursing note reviewed.   Constitutional:       General: He is not in acute distress.     Appearance: He is not ill-appearing.   HENT:      Head: Normocephalic.   Eyes:      General: No scleral icterus.  Cardiovascular:      Rate and Rhythm: Normal rate and regular rhythm.      Heart sounds: Normal heart sounds. No murmur heard.  Pulmonary:      Effort: No respiratory distress.      Breath sounds: Normal breath sounds. No wheezing.   Abdominal:      General: Abdomen is flat. There is no distension.      Palpations: Abdomen is soft.      Tenderness: There is no abdominal tenderness.   Musculoskeletal:         General: No tenderness.      Right lower leg: No edema.      Left lower leg: No edema.   Skin:     General: Skin is warm and dry.      Comments: Scarring of chest and abdomen   Neurological:      General: No focal deficit present.      Mental Status: He is alert and oriented to person, place, and time.   Psychiatric:         Attention and Perception: He does not perceive auditory or visual hallucinations.         Mood and Affect: Affect is flat.         Speech: Speech is rapid and pressured.         Behavior: Behavior is not agitated, aggressive or hyperactive. Behavior is cooperative.         Cognition and Memory: Memory is impaired. He exhibits impaired recent memory.      Comments: Inappropriate eye contact             Significant Labs:  All pertinent labs within the past 24 hours have been reviewed.    Significant Imaging: I have reviewed all pertinent imaging results/findings within the past 24 hours.

## 2024-07-06 NOTE — PLAN OF CARE
Problem: Fall Injury Risk  Goal: Absence of Fall and Fall-Related Injury  Outcome: Progressing     Problem: Adult Inpatient Plan of Care  Goal: Plan of Care Review  Outcome: Progressing  Goal: Patient-Specific Goal (Individualized)  Outcome: Progressing  Goal: Absence of Hospital-Acquired Illness or Injury  Outcome: Progressing  Goal: Optimal Comfort and Wellbeing  Outcome: Progressing  Goal: Readiness for Transition of Care  Outcome: Progressing     Problem: Restraint, Nonviolent  Goal: Absence of Harm or Injury  Reactivated

## 2024-07-06 NOTE — PLAN OF CARE
Problem: Fall Injury Risk  Goal: Absence of Fall and Fall-Related Injury  Outcome: Progressing     Problem: Adult Inpatient Plan of Care  Goal: Plan of Care Review  Outcome: Progressing  Goal: Patient-Specific Goal (Individualized)  Outcome: Progressing  Goal: Absence of Hospital-Acquired Illness or Injury  Outcome: Progressing  Goal: Optimal Comfort and Wellbeing  Outcome: Progressing  Goal: Readiness for Transition of Care  Outcome: Progressing     Problem: Skin Injury Risk Increased  Goal: Skin Health and Integrity  Outcome: Progressing     Problem: Pain Acute  Goal: Optimal Pain Control and Function  Outcome: Progressing     Problem: Infection  Goal: Absence of Infection Signs and Symptoms  Outcome: Progressing

## 2024-07-06 NOTE — ASSESSMENT & PLAN NOTE
Resolved  Presents with reported confusion then agitation after being found under a truck. Initially oriented to person, but is not answering questions on my exam after receiving sedation in the ED. Workup overall unrevealing for obvious etiology, including CT head.  Urine drug screen pending; highly suspect illicit substance use leading to agitation and encephalopathy.  Monitor for signs of illicit substance withdrawal, and treat with supportive care.  Of note, he was PEC'd by the ED due to agitation.   Patient not forthcoming on evaluation 7/4 with HM as described in hospital course. Concern for grave disability.   CEC'd for grave disability on 7/5  - psych consulted   Recent psychiatric hospitalization    Keep CEC in place   Anticipate transfer to inpatient psych facility

## 2024-07-06 NOTE — PROGRESS NOTES
"CONSULTATION LIAISON PSYCHIATRY PROGRESS NOTE    Patient Name: Beka Davis  MRN: [id number]  Patient Class: IP- Inpatient  Admission Date: 7/3/2024  Attending Physician: Tomasa Crowder MD      SUBJECTIVE:   Beka Davis is a 18 y.o. male with past psychiatric history of self-reported bipolar disorder, PTSD, and SUSAN who presented to the ED after being found under a truck with a fever and admitted to the hospital for Toxic metabolic encephalopathy. Per chart review, he received Haldol 5mg and Ativan 3mg on day of admission for non-redirectable agitation. He has not required PRNs for agitation/aggression or psychosis yesterday and so far today.     Psychiatry consulted for "PEC'd in ED after being found down for 2 days"     Overnight Events:  Pt seen by  and placed on CEC 7/5/2024.     Today, pt found seated in chair in room. Pt calm and cooperative with a linear thought process and blunted affect. Pt states that he cannot remember what, why, or how he arrived to the hospital. Pt admits to using Fentanyl and Alcohol prior to arriving to hospital. Pt states that he has had seizures from alcohol withdrawal before, last seizure was approximately one year ago. Pt reports a history of bipolar disorder and PTSD treated with lithium and valium. Pt states that his outpatient psychiatrist was at [facility] but has not returned to fill his prescriptions in several months. Pt reports being homeless for the last few years and would like to be discharged back to the streets. Pt denies any social support and provides limited insight regarding his substance use. Pt denies any SI/HI/AVH.        OBJECTIVE:    Mental Status Exam:  General Appearance: appears stated age, well developed and nourished, adequately groomed and appropriately dressed, in no acute distress  Behavior: normal; cooperative; reasonably friendly, pleasant, and polite; appropriate eye-contact; under good behavioral control  Involuntary Movements and Motor " "Activity: no abnormal involuntary movements noted; no tics, no tremors, no akathisia, no dystonia, no evidence of tardive dyskinesia; no psychomotor agitation or retardation  Gait and Station: unable to assess - patient lying down or seated  Speech and Language: slowed, soft, monotone  Mood: "Good"  Affect: blunted  Thought Process and Associations: intact; linear, goal-directed, organized, and logical; no loosening of associations noted  Thought Content and Perceptions:: no suicidal or homicidal ideation, no auditory or visual hallucinations, no paranoid ideation, no ideas of reference, no evidence of delusions or psychosis  Sensorium and Orientation: intact; alert with clear sensorium; oriented fully to person, place, time and situation  Recent and Remote Memory: grossly intact, able to recall relevant and salient information from the recent and remote past  Attention and Concentration: grossly intact, attentive to the conversation and not readily distractible  Fund of Knowledge: grossly intact, used appropriate vocabulary and demonstrated an awareness of current events, consistent with educational level achieved  Insight: impaired  Judgment: impaired    CAM ICU positive? no      ASSESSMENT & RECOMMENDATIONS   Alcohol use disorder, severe  Opioid use disorder, severe  History of polysubstance abuse, primarily alcohol and fentanyl. Monitor patients for sign of alcohol withdrawal with CIWA score and opioid withdrawal with COWS  Continue PRN ativan 2mg q4h for alcohol withdrawal signs or symptoms  Please document reason/parameters for Ativan administration.   Continue repletion of vitamins, thiamine, and folic acid  PRN Opioid withdrawal medications as below:   clonidine 0.1 mg po q4 hours prn opiate withdrawal HTN  dicylomine 10 mg q6 hours prn abdominal muscle cramps  loperamide 2 mg q 1 hour prn diarrhea (max= 16 mg/24 hours)  methocarbamol 500 mg po q 6 hours prn muscle spasm  ibuprofen 400mg po q6hrs PRN " pain  zofran 4mg PO q8hrs PRN OR phenergan 12.5mg PO q8hrs PRN nausea  vistaril 50mg PO q8hrs PRN anxiety      Self-reported Generalized anxiety disorder, Bipolar disorder, and PTSD  R/O Feigning/exaggerating of symptoms for secondary gain  Pt exhibiting signs of hypomania today, labile behavior throughout day with different interviewers. Suspect that drugs/alcohol were the major contributors to symptoms on presentation. However, pt would benefit form initiating psychotropic medication.   Start Seroquel 50mg qhs.      RISK ASSESSMENT  Continue CEC because patient is in imminent danger of hurting self or others and is gravely disabled. & NEEDS 1:1 sitter     FOLLOW UP  Will sign off     DISPOSITION - once medically cleared:   Seek involuntary inpatient psychiatric admission for stabilization of acute psychiatric symptoms and a safe disposition plan is enacted. The patient &/or their family was informed that the patient will be transferred to an inpatient unit per ED/primary placement team.     Please contact ON CALL psychiatry service (24/7) for any acute issues that may arise.    Dr. Robbie Vargas   Psychiatry  [facility]  7/6/2024 5:27 PM        --------------------------------------------------------------------------------------------------------------------------------------------------------------------------------------------------------------------------------------    CONTINUED OBJECTIVE clinical data & findings reviewed and noted for above decision making    Current Medications:   Scheduled Meds:    folic acid  1 mg Oral Daily    multivitamin  1 tablet Oral Daily    thiamine  100 mg Oral Daily     PRN Meds:   Current Facility-Administered Medications:     acetaminophen, 650 mg, Oral, Q8H PRN    acetaminophen, 650 mg, Oral, Q4H PRN    aluminum-magnesium hydroxide-simethicone, 30 mL, Oral, QID PRN    glucagon (human recombinant), 1 mg, Intramuscular, PRN    glucose, 16 g, Oral, PRN     glucose, 24 g, Oral, PRN    lorazepam, 2 mg, Intravenous, Q10 Min PRN    LORazepam, 2 mg, Oral, Q4H PRN    melatonin, 6 mg, Oral, Nightly PRN    naloxone, 0.02 mg, Intravenous, PRN    ondansetron, 4 mg, Oral, Q6H PRN    polyethylene glycol, 17 g, Oral, Daily PRN    simethicone, 1 tablet, Oral, QID PRN    sodium chloride 0.9%, 1-10 mL, Intravenous, Q12H PRN    Allergies:   Review of patient's allergies indicates:  No Known Allergies    Vitals  Vitals:    07/06/24 1649   BP: 121/77   Pulse: 61   Resp:    Temp: 98 °F (36.7 °C)       Labs/Imaging/Studies:  Recent Results (from the past 24 hour(s))   POCT glucose    Collection Time: 07/05/24  7:55 PM   Result Value Ref Range    POCT Glucose 120 (H) 70 - 110 mg/dL   CBC Without Differential    Collection Time: 07/06/24  2:15 AM   Result Value Ref Range    WBC 4.27 3.90 - 12.70 K/uL    RBC 4.49 (L) 4.60 - 6.20 M/uL    Hemoglobin 14.1 14.0 - 18.0 g/dL    Hematocrit 42.1 40.0 - 54.0 %    MCV 94 82 - 98 fL    MCH 31.4 (H) 27.0 - 31.0 pg    MCHC 33.5 32.0 - 36.0 g/dL    RDW 13.4 11.5 - 14.5 %    Platelets 235 150 - 450 K/uL    MPV 10.7 9.2 - 12.9 fL   Comprehensive Metabolic Panel    Collection Time: 07/06/24  2:15 AM   Result Value Ref Range    Sodium 144 136 - 145 mmol/L    Potassium 3.3 (L) 3.5 - 5.1 mmol/L    Chloride 106 95 - 110 mmol/L    CO2 26 23 - 29 mmol/L    Glucose 96 70 - 110 mg/dL    BUN 8 6 - 20 mg/dL    Creatinine 0.8 0.5 - 1.4 mg/dL    Calcium 8.8 8.7 - 10.5 mg/dL    Total Protein 6.4 6.0 - 8.4 g/dL    Albumin 3.4 (L) 3.5 - 5.2 g/dL    Total Bilirubin 0.4 0.1 - 1.0 mg/dL    Alkaline Phosphatase 61 55 - 135 U/L    AST 18 10 - 40 U/L    ALT 15 10 - 44 U/L    eGFR >60.0 >60 mL/min/1.73 m^2    Anion Gap 12 8 - 16 mmol/L     Imaging Results              CTA Head and Neck (xpd) (Final result)  Result time 07/03/24 23:49:58      Final result by Chon Kearns MD (07/03/24 23:49:58)                   Impression:      Examination is significantly degraded secondary to  motion.    No CT evidence of large territorial infarction.  MRI of the brain may be obtained further evaluation    No acute vascular abnormality in the neck and brain, allowing for motion limitations.  Suggest repeat examination, when the patient is able tolerate positioning.    Thyroid nodules.  Outpatient thyroid ultrasound is suggested for further evaluation.      Electronically signed by: Chon Kearns MD  Date:    07/03/2024  Time:    23:49               Narrative:    EXAMINATION:  CTA HEAD AND NECK (XPD)    CLINICAL HISTORY:  Neuro deficit, acute, stroke suspected;AMS and potential strangulation injury;    TECHNIQUE:  Non contrast low dose axial images were obtained through the head.  CT angiogram was performed from the level of the phoenix to the top of the head following the IV administration of 75mL of Omnipaque 350.   Sagittal and coronal reconstructions and maximum intensity projection reconstructions were performed. Arterial stenosis percentages are based on NASCET measurement criteria.    CT source data was analyzed using artificial intelligence software for detection of a large vessel occlusions (LVO) in order to enable computer assisted triage notification and aid clinical stroke decision making.    COMPARISON:  None    FINDINGS:  Noncontrast CT head:    The subcutaneous tissues are unremarkable.  The bony calvarium is intact.  The paranasal sinuses are unremarkable.  The mastoid air cells are clear.  The orbits and intraorbital contents are unremarkable.    The craniocervical junction is intact.  The sellar and parasellar structures are unremarkable.  There is no evidence of intracranial hemorrhage.  The ventricles and sulci are within normal limits.  The cisterns are unremarkable.  The gray-white differentiation is maintained.  There is no dense vessel sign.  There is no evidence of mass effect.    CTA neck:    The visualized pulmonary tree is within normal limits.  The great vessels arising from the  aortic arch are within normal limits.  The visualized subclavian arteries are within normal limits.    There are some motion limitations at the base of the neck.  Overall evaluation of the carotids at the base of the neck is significantly limited.    The vertebral artery origins are unremarkable.  The vertebral arteries appear normal in caliber.    No definite evidence of dissection or aneurysm of the neck vessels.    CTA head:    The intracranial segments of the ICA appear grossly unremarkable.  The anterior cerebral arteries appear grossly intact.  The middle cerebral arteries are unremarkable.    The basilar is unremarkable.  The posterior cerebral arteries are unremarkable.    The dural venous sinuses are within normal limits.    There is no abnormal intracranial enhancement.    Additional findings:    There are subcentimeter bilateral thyroid nodules.  The remainder of the soft tissue the neck are within normal limits.    The visualized lung apices are unremarkable.  There is no evidence of a pneumothorax.                                       X-Ray Chest AP Portable (Final result)  Result time 07/03/24 20:17:35      Final result by Nigel Woods MD (07/03/24 20:17:35)                   Impression:      1. Coarse interstitial attenuation noting patient is rotated.  Edema or other nonspecific pneumonitis are considerations.  Correlation is advised.      Electronically signed by: Nigel Woods MD  Date:    07/03/2024  Time:    20:17               Narrative:    EXAMINATION:  XR CHEST AP PORTABLE    CLINICAL HISTORY:  Sepsis;    TECHNIQUE:  Single frontal view of the chest was performed.    COMPARISON:  None    FINDINGS:  Patient is rotated.    The cardiomediastinal silhouette is not enlarged.  There is no pleural effusion.  The trachea is midline.  The lungs are symmetrically expanded bilaterally with coarse interstitial attenuation.  No large focal consolidation seen.  There is no pneumothorax.  The  osseous structures are unremarkable.

## 2024-07-07 VITALS
WEIGHT: 140 LBS | HEART RATE: 89 BPM | OXYGEN SATURATION: 98 % | TEMPERATURE: 98 F | DIASTOLIC BLOOD PRESSURE: 69 MMHG | HEIGHT: 66 IN | RESPIRATION RATE: 18 BRPM | SYSTOLIC BLOOD PRESSURE: 108 MMHG | BODY MASS INDEX: 22.5 KG/M2

## 2024-07-07 LAB
ALBUMIN SERPL BCP-MCNC: 3.4 G/DL (ref 3.5–5.2)
ALP SERPL-CCNC: 63 U/L (ref 55–135)
ALT SERPL W/O P-5'-P-CCNC: 13 U/L (ref 10–44)
ANION GAP SERPL CALC-SCNC: 8 MMOL/L (ref 8–16)
AST SERPL-CCNC: 14 U/L (ref 10–40)
BILIRUB SERPL-MCNC: 0.2 MG/DL (ref 0.1–1)
BUN SERPL-MCNC: 11 MG/DL (ref 6–20)
CALCIUM SERPL-MCNC: 9 MG/DL (ref 8.7–10.5)
CHLORIDE SERPL-SCNC: 109 MMOL/L (ref 95–110)
CO2 SERPL-SCNC: 22 MMOL/L (ref 23–29)
CREAT SERPL-MCNC: 0.8 MG/DL (ref 0.5–1.4)
ERYTHROCYTE [DISTWIDTH] IN BLOOD BY AUTOMATED COUNT: 13.5 % (ref 11.5–14.5)
EST. GFR  (NO RACE VARIABLE): >60 ML/MIN/1.73 M^2
GLUCOSE SERPL-MCNC: 98 MG/DL (ref 70–110)
HCT VFR BLD AUTO: 46.8 % (ref 40–54)
HGB BLD-MCNC: 15.2 G/DL (ref 14–18)
MCH RBC QN AUTO: 31.2 PG (ref 27–31)
MCHC RBC AUTO-ENTMCNC: 32.5 G/DL (ref 32–36)
MCV RBC AUTO: 96 FL (ref 82–98)
PLATELET # BLD AUTO: 265 K/UL (ref 150–450)
PMV BLD AUTO: 11 FL (ref 9.2–12.9)
POTASSIUM SERPL-SCNC: 4.2 MMOL/L (ref 3.5–5.1)
PROT SERPL-MCNC: 6.6 G/DL (ref 6–8.4)
RBC # BLD AUTO: 4.87 M/UL (ref 4.6–6.2)
SODIUM SERPL-SCNC: 139 MMOL/L (ref 136–145)
WBC # BLD AUTO: 4.66 K/UL (ref 3.9–12.7)

## 2024-07-07 PROCEDURE — 36415 COLL VENOUS BLD VENIPUNCTURE: CPT | Performed by: STUDENT IN AN ORGANIZED HEALTH CARE EDUCATION/TRAINING PROGRAM

## 2024-07-07 PROCEDURE — 25000003 PHARM REV CODE 250: Performed by: STUDENT IN AN ORGANIZED HEALTH CARE EDUCATION/TRAINING PROGRAM

## 2024-07-07 PROCEDURE — 80053 COMPREHEN METABOLIC PANEL: CPT | Performed by: STUDENT IN AN ORGANIZED HEALTH CARE EDUCATION/TRAINING PROGRAM

## 2024-07-07 PROCEDURE — 85027 COMPLETE CBC AUTOMATED: CPT | Performed by: STUDENT IN AN ORGANIZED HEALTH CARE EDUCATION/TRAINING PROGRAM

## 2024-07-07 PROCEDURE — 99232 SBSQ HOSP IP/OBS MODERATE 35: CPT | Mod: ,,, | Performed by: PSYCHIATRY & NEUROLOGY

## 2024-07-07 RX ADMIN — THERA TABS 1 TABLET: TAB at 08:07

## 2024-07-07 RX ADMIN — LORAZEPAM 2 MG: 1 TABLET ORAL at 08:07

## 2024-07-07 RX ADMIN — Medication 100 MG: at 08:07

## 2024-07-07 RX ADMIN — FOLIC ACID 1 MG: 1 TABLET ORAL at 08:07

## 2024-07-07 NOTE — PROGRESS NOTES
[facility]  Progress Note    Patient Name: Beka Davis  MRN: [id number]  Patient Class: IP- Inpatient   Admission Date: 7/3/2024  Length of Stay: 1 days  Attending Physician: Tomasa Crowder MD  Primary Care Provider: Anna, Primary Doctor        Subjective:     Principal Problem:Toxic metabolic encephalopathy        HPI:  Beka Davis is a 18 y.o. male with unknown medical history who presents after being found under a truck.    Due to receiving sedation in the ED for agitation, he was unable to provide a history.    Per the ED, he was brought in by EMS after someone found him under a semi truck that he had been staying under.  He was reportedly oriented to person on arrival here, however agitated.  Received Ativan and Haldol, and now we will not participate in questioning or exam.  He was initially febrile and tachycardic.  Workup overall unremarkable for obvious etiology, therefore Hospital Medicine was consulted for admission.  He was PEC'd by the ED.    Overview/Hospital Course:  7/4: Upon my evaluation, patient denies all symptoms. Unable to tell me what he remembers prior to coming to the hospital. He is oriented to situation and time. Denies drug use. Patient is unconcerned with events that lead to his hospitalization. When asked if he has a place to go when he leaves the hospital, he reported that he stays with his mother. Reports that his mother is aware that he is in the hospital but he does not want his mother to be updated in regards to his care. Denies any medical problems. Patient denies having ever seen a doctor in his life, including a pediatrician as a child. Patient denies SI or HI. Denies auditory or visual hallucinations. On physical exam, patient noted to have scars on his chest and abdomen. I am concerned about this patient's medical decision making capacity. Psychiatry has been consulted for assistance.    Psychiatry met with patient evening of 7/4 - patient  endorsed psychiatric history with recent IP stay, manic symptoms, and hallucinations.     7/5: Patient much more willing to discuss previous medical history. 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 He is still interested in inpatient psychiatric treatment. CIWA of 7 on my assessment, mild withdrawal.     Patient assessed by 's office following morning rounds. Patient CEC'd based on that evaluation. Patient seen by psychiatry shortly after receiving IV ativan by nursing, when PO should have been administered, withdrawal VS parameters. Psychiatry did not observe symptoms of keesha at that time.     7/6: Patient continues to display manic symptoms during my evaluation. He is willing to go to inpatient psychiatry if that means he will leave this hospital today. He does not have a plan on where is going to go upon discharge, although he requests discharge repeatedly during interview. When I discussed with patient my concern that he may have withdrawal seizures if he were to not be in a medical facility, he stated he did not think that would happen. While patient denies all symptoms on my interview today, he has been inconsistent with participation and has minimized symptoms during this hospitalization.Psychiatry has been re-engaged for evaluation of patient today.     7/7: Patient pacing with strong urge to walk the hallways. Focused on leaving the hospital. He denies difficulty sleeping last night. Earlier in AM received PO ativan for CIWA>8, headache and anxiety. On my evaluation, denies headache, endorses worsening of baseline anxiety. Patient medically cleared for transfer to inpatient psychiatric facility where he would be under medical observation for further withdrawal symptoms. Patient has history of withdrawal seizures and is still within the window for this risk.     Interval History: see above    Review of Systems  Objective:     Vital  Signs (Most Recent):  Temp: 98.4 °F (36.9 °C) (07/07/24 1109)  Pulse: 89 (07/07/24 1109)  Resp: 18 (07/07/24 1109)  BP: 108/69 (07/07/24 1109)  SpO2: 98 % (07/07/24 1109) Vital Signs (24h Range):  Temp:  [97.3 °F (36.3 °C)-98.7 °F (37.1 °C)] 98.4 °F (36.9 °C)  Pulse:  [61-89] 89  Resp:  [18-20] 18  SpO2:  [93 %-99 %] 98 %  BP: (104-123)/(65-77) 108/69     Weight: 63.5 kg (140 lb)  Body mass index is 22.6 kg/m².  No intake or output data in the 24 hours ending 07/07/24 1247      Physical Exam  Vitals and nursing note reviewed.   Constitutional:       General: He is not in acute distress.     Appearance: He is not ill-appearing.   HENT:      Head: Normocephalic.   Eyes:      General: No scleral icterus.  Cardiovascular:      Rate and Rhythm: Normal rate and regular rhythm.      Heart sounds: Normal heart sounds. No murmur heard.  Pulmonary:      Effort: No respiratory distress.      Breath sounds: Normal breath sounds. No wheezing.   Abdominal:      General: Abdomen is flat. There is no distension.      Palpations: Abdomen is soft.      Tenderness: There is no abdominal tenderness.   Musculoskeletal:         General: No tenderness.      Right lower leg: No edema.      Left lower leg: No edema.   Skin:     General: Skin is warm and dry.      Comments: Scarring of chest and abdomen   Neurological:      General: No focal deficit present.      Mental Status: He is alert and oriented to person, place, and time.   Psychiatric:         Attention and Perception: He does not perceive auditory or visual hallucinations.         Mood and Affect: Affect is flat.         Behavior: Behavior is hyperactive. Behavior is not agitated or aggressive. Behavior is cooperative.         Cognition and Memory: Memory is impaired. He exhibits impaired recent memory.      Comments: Inappropriate eye contact             Significant Labs: All pertinent labs within the past 24 hours have been reviewed.    Significant Imaging: I have reviewed all  pertinent imaging results/findings within the past 24 hours.    Assessment/Plan:      * Toxic metabolic encephalopathy  Resolved  Presents with reported confusion then agitation after being found under a truck. Initially oriented to person, but is not answering questions on my exam after receiving sedation in the ED. Workup overall unrevealing for obvious etiology, including CT head.  Urine drug screen pending; highly suspect illicit substance use leading to agitation and encephalopathy.  Monitor for signs of illicit substance withdrawal, and treat with supportive care.  Of note, he was PEC'd by the ED due to agitation.   Patient not forthcoming on evaluation 7/4 with HM as described in hospital course. Concern for grave disability.   CEC'd for grave disability on 7/5  - psych consulted   Recent psychiatric hospitalization    Keep CEC in place   Anticipate transfer to inpatient psych facility     ETOH abuse  Patient reports heavy drinking and history of seizures with withdrawal. Unable to quantify amount of daily alcohol use or last known drink. EtOH negative on admission 7/3.   - CIWA q4h  - seizure and fall precautions  - Ativan 2 mg PO q4hrs prn with parameters for withdrawal: tremors, SBP >160, DBP >100; HOLD for somnolence or RR <12 or SBP <100  - Ativan 2 mg IM q10m prn for seizures  - Multivitamin 1 tablet daily   - Folic acid 1 mg oral daily   - thiamine daily   - psych consulted    Elevated CPK  CPK mildly elevated at 1717, without MAHESH or elevated LFTs, therefore does not represent rhabdomyolysis.  Continue IV fluids.      Thyroid nodule  Thyroid nodules noted on CT.  TSH within normal limits.  Recommend outpatient monitoring.      SIRS (systemic inflammatory response syndrome)  Resolved  Initially febrile on presentation with tachycardia, however without signs of focal infection.  He has no leukocytosis, and lactate within normal limits.  Infectious work-up negative. Suspect fever was due to prolonged heat  exposure.       Respiratory alkalosis  Respiratory alkalosis noted on VBG, likely secondary to illicit substance use/ overdose.  Monitor.      Homelessness  Reportedly homeless, complicating his health.        VTE Risk Mitigation (From admission, onward)           Ordered     IP VTE LOW RISK PATIENT  Once         07/04/24 0045     Place sequential compression device  Until discontinued         07/04/24 0045                    Discharge Planning   DARWIN: 7/7/2024     Code Status: Full Code   Is the patient medically ready for discharge?:     Reason for patient still in hospital (select all that apply): Consult recommendations  Discharge Plan A: [facility], [facility]                  Tomasa Crowder MD  Department of Hospital Medicine   [facility]

## 2024-07-07 NOTE — PLAN OF CARE
Problem: Fall Injury Risk  Goal: Absence of Fall and Fall-Related Injury  Outcome: Progressing     Problem: Restraint, Nonviolent  Goal: Absence of Harm or Injury  Outcome: Progressing     Problem: Adult Inpatient Plan of Care  Goal: Plan of Care Review  Outcome: Progressing  Goal: Patient-Specific Goal (Individualized)  Outcome: Progressing  Goal: Absence of Hospital-Acquired Illness or Injury  Outcome: Progressing  Goal: Optimal Comfort and Wellbeing  Outcome: Progressing  Goal: Readiness for Transition of Care  Outcome: Progressing     Problem: Skin Injury Risk Increased  Goal: Skin Health and Integrity  Outcome: Progressing

## 2024-07-07 NOTE — PROGRESS NOTES
"CONSULTATION LIAISON PSYCHIATRY PROGRESS NOTE    Patient Name: Beka Davis  MRN: [id number]  Patient Class: IP- Inpatient  Admission Date: 7/3/2024  Attending Physician: Tomasa Crowder MD       SUBJECTIVE:   Beka Davis is a 18 y.o. male with past psychiatric history of self-reported bipolar disorder, PTSD, and SUSAN who presented to the ED after being found under a truck with a fever and admitted to the hospital for Toxic metabolic encephalopathy. Per chart review, he received Haldol 5mg and Ativan 3mg on day of admission for non-redirectable agitation.     Psychiatry consulted for "PEC'd in ED after being found down for 2 days"     Overnight Events:  Patient did not require any PRNs overnight. Today, patient was sitting in bed, calm and amenable for interview. He reports feeling "alright", but waking up with some anxiety from still being in the hospital. He did not take the scheduled Seroquel last night. He reports feeling better overall since his he first came in. He is oriented to person, place, time, and denies any major concerns.        OBJECTIVE:    Mental Status Exam:  General Appearance: appears stated age, well developed and nourished, adequately groomed and appropriately dressed, in no acute distress  Behavior: normal; cooperative; reasonably friendly, pleasant, and polite; appropriate eye-contact; under good behavioral control  Involuntary Movements and Motor Activity: no abnormal involuntary movements noted; no tics, no tremors, no akathisia, no dystonia, no evidence of tardive dyskinesia; no psychomotor agitation or retardation  Gait and Station: did not assess, patient remained in bed throughout interview but repositioning himself without difficulty   Speech and Language: normal rate, normal tone, normal volume   Mood: "alright"  Affect: blunted, reactive at times   Thought Process and Associations: intact; linear, goal-directed, organized, and logical; no loosening of associations noted  Thought " Content and Perceptions:: no suicidal or homicidal ideation, no auditory or visual hallucinations, no paranoid ideation, no ideas of reference, no evidence of delusions or psychosis  Sensorium and Orientation: intact; alert with clear sensorium; oriented fully to person, place, time and situation  Recent and Remote Memory: grossly intact, able to recall relevant and salient information from the recent and remote past  Attention and Concentration: grossly intact, attentive to the conversation and not readily distractible  Fund of Knowledge: grossly intact, used appropriate vocabulary and demonstrated an awareness of current events, consistent with educational level achieved  Insight: Limited   Judgment: Appropriate for setting, Improved     CAM ICU positive? no      ASSESSMENT & RECOMMENDATIONS   Alcohol use disorder, severe  Opioid use disorder, severe  History of polysubstance abuse, primarily alcohol and fentanyl. Monitor patients for sign of alcohol withdrawal with CIWA score and opioid withdrawal with COWS  Continue PRN ativan 2mg q4h for alcohol withdrawal signs or symptoms  Please document reason/parameters for Ativan administration.   Continue repletion of vitamins, thiamine, and folic acid  He is not interested in rehab or substance use treatment at this time   PRN Opioid withdrawal medications as below:   clonidine 0.1 mg po q4 hours prn opiate withdrawal HTN  dicylomine 10 mg q6 hours prn abdominal muscle cramps  loperamide 2 mg q 1 hour prn diarrhea (max= 16 mg/24 hours)  methocarbamol 500 mg po q 6 hours prn muscle spasm  ibuprofen 400mg po q6hrs PRN pain  zofran 4mg PO q8hrs PRN OR phenergan 12.5mg PO q8hrs PRN nausea  vistaril 50mg PO q8hrs PRN anxiety      Self-reported Generalized anxiety disorder, Bipolar disorder, and PTSD  R/O Feigning/exaggerating of symptoms for secondary gain  On admission, patient exhibited signs of hypomania, labile behavior throughout day with different interviewers. Suspect  that drugs/alcohol were the major contributors to symptoms on presentation. However, pt would benefit form psychotropic medication. Continues to endorse anxiety, overall improved on admission.    Continue Seroquel 50mg qhs.      RISK ASSESSMENT  Rescind CEC as patient does not appear to be in imminent danger of hurting self or others and is not gravely disabled. His mood appears to have stabilized with further washout of substances. & Discontinue 1:1 sitter     FOLLOW UP  Will sign off     DISPOSITION - once medically cleared:   Does not warrant inpatient hospitalization per evaluation today.  Patient discussed wish to be discharged to [facility], has family in town. He is interested in outpatient follow-up at the [facility], he declines inpatient rehab or substance use treatment.     Please contact ON CALL psychiatry service (24/7) for any acute issues that may arise.    Dr. Judith MADDEN Psychiatry  [facility]  7/7/2024 5:27 PM      --------------------------------------------------------------------------------------------------------------------------------------------------------------------------------------------------------------------------------------    CONTINUED OBJECTIVE clinical data & findings reviewed and noted for above decision making    Current Medications:   Scheduled Meds:    folic acid  1 mg Oral Daily    multivitamin  1 tablet Oral Daily    QUEtiapine  50 mg Oral QHS    thiamine  100 mg Oral Daily     PRN Meds:   Current Facility-Administered Medications:     acetaminophen, 650 mg, Oral, Q8H PRN    acetaminophen, 650 mg, Oral, Q4H PRN    aluminum-magnesium hydroxide-simethicone, 30 mL, Oral, QID PRN    glucagon (human recombinant), 1 mg, Intramuscular, PRN    glucose, 16 g, Oral, PRN    glucose, 24 g, Oral, PRN    lorazepam, 2 mg, Intravenous, Q10 Min PRN    LORazepam, 2 mg, Oral, Q4H PRN    melatonin, 6 mg, Oral, Nightly PRN    naloxone, 0.02 mg, Intravenous, PRN    ondansetron,  4 mg, Oral, Q6H PRN    polyethylene glycol, 17 g, Oral, Daily PRN    simethicone, 1 tablet, Oral, QID PRN    sodium chloride 0.9%, 1-10 mL, Intravenous, Q12H PRN    Allergies:   Review of patient's allergies indicates:  No Known Allergies    Vitals  Vitals:    07/07/24 1109   BP: 108/69   Pulse: 89   Resp: 18   Temp: 98.4 °F (36.9 °C)       Labs/Imaging/Studies:  Recent Results (from the past 24 hour(s))   CBC Without Differential    Collection Time: 07/07/24  5:46 AM   Result Value Ref Range    WBC 4.66 3.90 - 12.70 K/uL    RBC 4.87 4.60 - 6.20 M/uL    Hemoglobin 15.2 14.0 - 18.0 g/dL    Hematocrit 46.8 40.0 - 54.0 %    MCV 96 82 - 98 fL    MCH 31.2 (H) 27.0 - 31.0 pg    MCHC 32.5 32.0 - 36.0 g/dL    RDW 13.5 11.5 - 14.5 %    Platelets 265 150 - 450 K/uL    MPV 11.0 9.2 - 12.9 fL   Comprehensive Metabolic Panel    Collection Time: 07/07/24  5:46 AM   Result Value Ref Range    Sodium 139 136 - 145 mmol/L    Potassium 4.2 3.5 - 5.1 mmol/L    Chloride 109 95 - 110 mmol/L    CO2 22 (L) 23 - 29 mmol/L    Glucose 98 70 - 110 mg/dL    BUN 11 6 - 20 mg/dL    Creatinine 0.8 0.5 - 1.4 mg/dL    Calcium 9.0 8.7 - 10.5 mg/dL    Total Protein 6.6 6.0 - 8.4 g/dL    Albumin 3.4 (L) 3.5 - 5.2 g/dL    Total Bilirubin 0.2 0.1 - 1.0 mg/dL    Alkaline Phosphatase 63 55 - 135 U/L    AST 14 10 - 40 U/L    ALT 13 10 - 44 U/L    eGFR >60.0 >60 mL/min/1.73 m^2    Anion Gap 8 8 - 16 mmol/L     Imaging Results              CTA Head and Neck (xpd) (Final result)  Result time 07/03/24 23:49:58      Final result by Chon Kearns MD (07/03/24 23:49:58)                   Impression:      Examination is significantly degraded secondary to motion.    No CT evidence of large territorial infarction.  MRI of the brain may be obtained further evaluation    No acute vascular abnormality in the neck and brain, allowing for motion limitations.  Suggest repeat examination, when the patient is able tolerate positioning.    Thyroid nodules.  Outpatient  thyroid ultrasound is suggested for further evaluation.      Electronically signed by: Chon Kearns MD  Date:    07/03/2024  Time:    23:49               Narrative:    EXAMINATION:  CTA HEAD AND NECK (XPD)    CLINICAL HISTORY:  Neuro deficit, acute, stroke suspected;AMS and potential strangulation injury;    TECHNIQUE:  Non contrast low dose axial images were obtained through the head.  CT angiogram was performed from the level of the phoenix to the top of the head following the IV administration of 75mL of Omnipaque 350.   Sagittal and coronal reconstructions and maximum intensity projection reconstructions were performed. Arterial stenosis percentages are based on NASCET measurement criteria.    CT source data was analyzed using artificial intelligence software for detection of a large vessel occlusions (LVO) in order to enable computer assisted triage notification and aid clinical stroke decision making.    COMPARISON:  None    FINDINGS:  Noncontrast CT head:    The subcutaneous tissues are unremarkable.  The bony calvarium is intact.  The paranasal sinuses are unremarkable.  The mastoid air cells are clear.  The orbits and intraorbital contents are unremarkable.    The craniocervical junction is intact.  The sellar and parasellar structures are unremarkable.  There is no evidence of intracranial hemorrhage.  The ventricles and sulci are within normal limits.  The cisterns are unremarkable.  The gray-white differentiation is maintained.  There is no dense vessel sign.  There is no evidence of mass effect.    CTA neck:    The visualized pulmonary tree is within normal limits.  The great vessels arising from the aortic arch are within normal limits.  The visualized subclavian arteries are within normal limits.    There are some motion limitations at the base of the neck.  Overall evaluation of the carotids at the base of the neck is significantly limited.    The vertebral artery origins are unremarkable.  The  vertebral arteries appear normal in caliber.    No definite evidence of dissection or aneurysm of the neck vessels.    CTA head:    The intracranial segments of the ICA appear grossly unremarkable.  The anterior cerebral arteries appear grossly intact.  The middle cerebral arteries are unremarkable.    The basilar is unremarkable.  The posterior cerebral arteries are unremarkable.    The dural venous sinuses are within normal limits.    There is no abnormal intracranial enhancement.    Additional findings:    There are subcentimeter bilateral thyroid nodules.  The remainder of the soft tissue the neck are within normal limits.    The visualized lung apices are unremarkable.  There is no evidence of a pneumothorax.                                       X-Ray Chest AP Portable (Final result)  Result time 07/03/24 20:17:35      Final result by Nigel Woods MD (07/03/24 20:17:35)                   Impression:      1. Coarse interstitial attenuation noting patient is rotated.  Edema or other nonspecific pneumonitis are considerations.  Correlation is advised.      Electronically signed by: Nigel Woods MD  Date:    07/03/2024  Time:    20:17               Narrative:    EXAMINATION:  XR CHEST AP PORTABLE    CLINICAL HISTORY:  Sepsis;    TECHNIQUE:  Single frontal view of the chest was performed.    COMPARISON:  None    FINDINGS:  Patient is rotated.    The cardiomediastinal silhouette is not enlarged.  There is no pleural effusion.  The trachea is midline.  The lungs are symmetrically expanded bilaterally with coarse interstitial attenuation.  No large focal consolidation seen.  There is no pneumothorax.  The osseous structures are unremarkable.

## 2024-07-07 NOTE — NURSING
Primary nurse Jamie removed peripheral IV. Patient is leaving AMA and Dr. Crowder got him to sign the AMA form. We checked with  Snehal to see if patient could be provided with any transportation or clothing,but per policy he could not be because he left AMA. I wheeled patient to hospital entrance where he left ambulating independently.

## 2024-07-07 NOTE — SUBJECTIVE & OBJECTIVE
Interval History: see above    Review of Systems  Objective:     Vital Signs (Most Recent):  Temp: 98.4 °F (36.9 °C) (07/07/24 1109)  Pulse: 89 (07/07/24 1109)  Resp: 18 (07/07/24 1109)  BP: 108/69 (07/07/24 1109)  SpO2: 98 % (07/07/24 1109) Vital Signs (24h Range):  Temp:  [97.3 °F (36.3 °C)-98.7 °F (37.1 °C)] 98.4 °F (36.9 °C)  Pulse:  [61-89] 89  Resp:  [18-20] 18  SpO2:  [93 %-99 %] 98 %  BP: (104-123)/(65-77) 108/69     Weight: 63.5 kg (140 lb)  Body mass index is 22.6 kg/m².  No intake or output data in the 24 hours ending 07/07/24 1247      Physical Exam  Vitals and nursing note reviewed.   Constitutional:       General: He is not in acute distress.     Appearance: He is not ill-appearing.   HENT:      Head: Normocephalic.   Eyes:      General: No scleral icterus.  Cardiovascular:      Rate and Rhythm: Normal rate and regular rhythm.      Heart sounds: Normal heart sounds. No murmur heard.  Pulmonary:      Effort: No respiratory distress.      Breath sounds: Normal breath sounds. No wheezing.   Abdominal:      General: Abdomen is flat. There is no distension.      Palpations: Abdomen is soft.      Tenderness: There is no abdominal tenderness.   Musculoskeletal:         General: No tenderness.      Right lower leg: No edema.      Left lower leg: No edema.   Skin:     General: Skin is warm and dry.      Comments: Scarring of chest and abdomen   Neurological:      General: No focal deficit present.      Mental Status: He is alert and oriented to person, place, and time.   Psychiatric:         Attention and Perception: He does not perceive auditory or visual hallucinations.         Mood and Affect: Affect is flat.         Behavior: Behavior is hyperactive. Behavior is not agitated or aggressive. Behavior is cooperative.         Cognition and Memory: Memory is impaired. He exhibits impaired recent memory.      Comments: Inappropriate eye contact             Significant Labs: All pertinent labs within the past 24  hours have been reviewed.    Significant Imaging: I have reviewed all pertinent imaging results/findings within the past 24 hours.   DISCHARGE

## 2024-07-07 NOTE — ASSESSMENT & PLAN NOTE
Patient reports heavy drinking and history of seizures with withdrawal. Unable to quantify amount of daily alcohol use or last known drink. EtOH negative on admission 7/3.   - CIWA q4h  - seizure and fall precautions  - Ativan 2 mg PO q4hrs prn with parameters for withdrawal: tremors, SBP >160, DBP >100; HOLD for somnolence or RR <12 or SBP <100  - Ativan 2 mg IM q10m prn for seizures  - Multivitamin 1 tablet daily   - Folic acid 1 mg oral daily   - thiamine daily   - psych consulted

## 2024-07-07 NOTE — DISCHARGE SUMMARY
[facility] Medicine  Discharge Summary      Patient Name: Beka Davis  MRN: [id number]  TOM: [id number]  Patient Class: IP- Inpatient  Admission Date: 7/3/2024  Hospital Length of Stay: 4 days  Discharge Date and Time: 7/7/2024  3:29 PM  Attending Physician: Tomasa Crowder MD   Discharging Provider: Tomasa Crowder MD  Primary Care Provider: Anna Primary Doctor  [facility] Medicine Team: [facility] MED Q Tomasa Crowder MD  Primary Care Team: [facility]    HPI:   Beka Davis is a 18 y.o. male with unknown medical history who presents after being found under a truck.    Due to receiving sedation in the ED for agitation, he was unable to provide a history.    Per the ED, he was brought in by EMS after someone found him under a semi truck that he had been staying under.  He was reportedly oriented to person on arrival here, however agitated.  Received Ativan and Haldol, and now we will not participate in questioning or exam.  He was initially febrile and tachycardic.  Workup overall unremarkable for obvious etiology, therefore Hospital Medicine was consulted for admission.  He was PEC'd by the ED.    * No surgery found *      Hospital Course:   7/4: Upon my evaluation, patient denies all symptoms. Unable to tell me what he remembers prior to coming to the hospital. He is oriented to situation and time. Denies drug use. Patient is unconcerned with events that lead to his hospitalization. When asked if he has a place to go when he leaves the hospital, he reported that he stays with his mother. Reports that his mother is aware that he is in the hospital but he does not want his mother to be updated in regards to his care. Denies any medical problems. Patient denies having ever seen a doctor in his life, including a pediatrician as a child. Patient denies SI or HI. Denies auditory or visual hallucinations. On physical exam, patient noted to have scars on his chest and abdomen. I am concerned  about this patient's medical decision making capacity. Psychiatry has been consulted for assistance.    Psychiatry met with patient evening of 7/4 - patient endorsed psychiatric history with recent IP stay, manic symptoms, and hallucinations.     7/5: Patient much more willing to discuss previous medical history. 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 He is still interested in inpatient psychiatric treatment. CIWA of 7 on my assessment, mild withdrawal.     Patient assessed by 's office following morning rounds. Patient CEC'd based on that evaluation. Patient seen by psychiatry shortly after receiving IV ativan by nursing, when PO should have been administered, withdrawal VS parameters. Psychiatry did not observe symptoms of keesha at that time.     7/6: Patient continues to display manic symptoms during my evaluation. He is willing to go to inpatient psychiatry if that means he will leave this hospital today. He does not have a plan on where is going to go upon discharge, although he requests discharge repeatedly during interview. When I discussed with patient my concern that he may have withdrawal seizures if he were to not be in a medical facility, he stated he did not think that would happen. While patient denies all symptoms on my interview today, he has been inconsistent with participation and has minimized symptoms during this hospitalization.Psychiatry has been re-engaged for evaluation of patient today.     7/7: Patient pacing with strong urge to walk the hallways. Focused on leaving the hospital. He denies difficulty sleeping last night. Earlier in AM received PO ativan for CIWA>8, headache and anxiety. On my evaluation, denies headache, endorses worsening of baseline anxiety. Patient medically cleared for transfer to inpatient psychiatric facility where he would be under medical observation for further withdrawal symptoms. Patient has  history of withdrawal seizures and is still within the window for this risk.      Psychiatry recommended rescinding the CEC afternoon 7/7. As patient still requiring prn ativan for withdrawal symptoms and within the window for seizures, it was recommended that the patient remain hospitalized for continued to care. Patient chose to discharge against medical advice.     Goals of Care Treatment Preferences:  Code Status: Full Code      Consults:   Consults (From admission, onward)          Status Ordering Provider     Inpatient consult to Psychiatry  Once        Provider:  (Not yet assigned)    JUAN LUIS Rocha          Toxic metabolic encephalopathy  Resolved  Presents with reported confusion then agitation after being found under a truck. Initially oriented to person, but is not answering questions on my exam after receiving sedation in the ED. Workup overall unrevealing for obvious etiology, including CT head.  Urine drug screen pending; highly suspect illicit substance use leading to agitation and encephalopathy.  Monitor for signs of illicit substance withdrawal, and treat with supportive care.  Of note, he was PEC'd by the ED due to agitation.   Patient not forthcoming on evaluation 7/4 with  as described in hospital course. Concern for grave disability.   CEC'd for grave disability on 7/5  - psych consulted         Recent psychiatric hospitalization          Keep CEC in place         Anticipate transfer to inpatient psych facility      ETOH abuse  Patient reports heavy drinking and history of seizures with withdrawal. Unable to quantify amount of daily alcohol use or last known drink. EtOH negative on admission 7/3.   - CIWA q4h  - seizure and fall precautions  - Ativan 2 mg PO q4hrs prn with parameters for withdrawal: tremors, SBP >160, DBP >100; HOLD for somnolence or RR <12 or SBP <100  - Ativan 2 mg IM q10m prn for seizures  - Multivitamin 1 tablet daily   - Folic acid 1 mg oral daily   -  thiamine daily   - psych consulted     Elevated CPK  CPK mildly elevated at 1717, without MAHESH or elevated LFTs, therefore does not represent rhabdomyolysis.  Continue IV fluids.        Thyroid nodule  Thyroid nodules noted on CT.  TSH within normal limits.  Recommend outpatient monitoring.        SIRS (systemic inflammatory response syndrome)  Resolved  Initially febrile on presentation with tachycardia, however without signs of focal infection.  He has no leukocytosis, and lactate within normal limits.  Infectious work-up negative. Suspect fever was due to prolonged heat exposure.         Respiratory alkalosis  Respiratory alkalosis noted on VBG, likely secondary to illicit substance use/ overdose.  Monitor.        Homelessness  Reportedly homeless, complicating his health.      Final Active Diagnoses:    Diagnosis Date Noted POA    PRINCIPAL PROBLEM:  Toxic metabolic encephalopathy [G92.8] 07/04/2024 Yes    ETOH abuse [F10.10] 07/05/2024 Yes    Homelessness [Z59.00] 07/04/2024 Not Applicable     Chronic    Respiratory alkalosis [E87.3] 07/04/2024 Yes    SIRS (systemic inflammatory response syndrome) [R65.10] 07/04/2024 Yes    Thyroid nodule [E04.1] 07/04/2024 Yes    Elevated CPK [R74.8] 07/04/2024 Yes      Problems Resolved During this Admission:       Discharged Condition: against medical advice    Disposition: Left Against Medical Adv*    Follow Up:   Follow-up Information       No, Primary Doctor .                           Patient Instructions:   No discharge procedures on file.    Significant Diagnostic Studies: N/A    Pending Diagnostic Studies:       None           Medications:  None    Indwelling Lines/Drains at time of discharge:   Lines/Drains/Airways       None                   Time spent on the discharge of patient: 35 minutes         Tomasa Crowder MD  Department of Hospital Medicine  [facility]

## 2024-07-08 LAB
BACTERIA BLD CULT: NORMAL
BACTERIA BLD CULT: NORMAL